# Patient Record
Sex: MALE | Race: ASIAN | NOT HISPANIC OR LATINO
[De-identification: names, ages, dates, MRNs, and addresses within clinical notes are randomized per-mention and may not be internally consistent; named-entity substitution may affect disease eponyms.]

---

## 2019-02-13 PROBLEM — Z00.00 ENCOUNTER FOR PREVENTIVE HEALTH EXAMINATION: Status: ACTIVE | Noted: 2019-02-13

## 2019-02-14 ENCOUNTER — APPOINTMENT (OUTPATIENT)
Dept: CARDIOLOGY | Facility: CLINIC | Age: 74
End: 2019-02-14

## 2019-11-07 ENCOUNTER — APPOINTMENT (OUTPATIENT)
Dept: CARDIOLOGY | Facility: CLINIC | Age: 74
End: 2019-11-07

## 2020-09-10 ENCOUNTER — NON-APPOINTMENT (OUTPATIENT)
Age: 75
End: 2020-09-10

## 2020-09-10 ENCOUNTER — APPOINTMENT (OUTPATIENT)
Dept: CARDIOLOGY | Facility: CLINIC | Age: 75
End: 2020-09-10
Payer: MEDICARE

## 2020-09-10 VITALS
WEIGHT: 174 LBS | BODY MASS INDEX: 28.99 KG/M2 | HEART RATE: 59 BPM | RESPIRATION RATE: 16 BRPM | SYSTOLIC BLOOD PRESSURE: 179 MMHG | HEIGHT: 65 IN | DIASTOLIC BLOOD PRESSURE: 81 MMHG | OXYGEN SATURATION: 96 % | TEMPERATURE: 97.7 F

## 2020-09-10 DIAGNOSIS — I25.10 ATHEROSCLEROTIC HEART DISEASE OF NATIVE CORONARY ARTERY W/OUT ANGINA PECTORIS: ICD-10-CM

## 2020-09-10 DIAGNOSIS — E78.5 HYPERLIPIDEMIA, UNSPECIFIED: ICD-10-CM

## 2020-09-10 DIAGNOSIS — Z98.61 CORONARY ANGIOPLASTY STATUS: ICD-10-CM

## 2020-09-10 DIAGNOSIS — I10 ESSENTIAL (PRIMARY) HYPERTENSION: ICD-10-CM

## 2020-09-10 DIAGNOSIS — Z87.891 PERSONAL HISTORY OF NICOTINE DEPENDENCE: ICD-10-CM

## 2020-09-10 PROCEDURE — 99204 OFFICE O/P NEW MOD 45 MIN: CPT

## 2020-09-10 PROCEDURE — 93000 ELECTROCARDIOGRAM COMPLETE: CPT

## 2020-09-10 PROCEDURE — 93306 TTE W/DOPPLER COMPLETE: CPT

## 2020-09-10 RX ORDER — METFORMIN HYDROCHLORIDE 500 MG/1
500 TABLET, COATED ORAL
Refills: 0 | Status: ACTIVE | COMMUNITY
Start: 2020-09-10

## 2020-09-10 RX ORDER — TAMSULOSIN HYDROCHLORIDE 0.4 MG/1
0.4 CAPSULE ORAL
Refills: 0 | Status: ACTIVE | COMMUNITY
Start: 2020-09-10

## 2020-09-10 RX ORDER — GLIMEPIRIDE 1 MG/1
1 TABLET ORAL
Refills: 0 | Status: ACTIVE | COMMUNITY
Start: 2020-09-10

## 2020-09-10 NOTE — DISCUSSION/SUMMARY
[FreeTextEntry1] : 74 M DM HTN hyperlipidemia CAD CABG PCI with CP and abnormal EKG consistent with progressive angina.\par DUE TO RECURRENT SYMPTOMS, REFER FOR CARDIAC CATHETERIZATION STAT.\par Continue ASA.\par Continue plavix, BB and statin.\par NG for CP.

## 2020-09-10 NOTE — HISTORY OF PRESENT ILLNESS
[FreeTextEntry1] : 1. HTN: on medications, controlled.\par 2. Hyperlipidemia: on statin.\par 3. CAD s/p CABG: patient had CABG about 20 years ago. Last PCI in 2018. He has noted worsening CP, midline, substernal, pressure-like, associated with dizziness and SOB. Symptoms have been worsening over the last 2 months. Symptoms occur on a daily basis and are increasing in frequency and limits his ET.

## 2020-09-10 NOTE — REVIEW OF SYSTEMS
[Shortness Of Breath] : shortness of breath [Dyspnea on exertion] : dyspnea during exertion [Chest Pain] : chest pain [Palpitations] : palpitations [Negative] : Endocrine

## 2020-09-10 NOTE — PHYSICAL EXAM
[Normal Appearance] : normal appearance [General Appearance - Well Developed] : well developed [Well Groomed] : well groomed [General Appearance - Well Nourished] : well nourished [No Deformities] : no deformities [General Appearance - In No Acute Distress] : no acute distress [Normal Conjunctiva] : the conjunctiva exhibited no abnormalities [Normal Oral Mucosa] : normal oral mucosa [No Oral Pallor] : no oral pallor [Eyelids - No Xanthelasma] : the eyelids demonstrated no xanthelasmas [Normal Jugular Venous A Waves Present] : normal jugular venous A waves present [No Oral Cyanosis] : no oral cyanosis [Normal Jugular Venous V Waves Present] : normal jugular venous V waves present [No Jugular Venous Napier A Waves] : no jugular venous napier A waves [Heart Sounds] : normal S1 and S2 [Heart Rate And Rhythm] : heart rate and rhythm were normal [Respiration, Rhythm And Depth] : normal respiratory rhythm and effort [Exaggerated Use Of Accessory Muscles For Inspiration] : no accessory muscle use [Auscultation Breath Sounds / Voice Sounds] : lungs were clear to auscultation bilaterally [Abdomen Tenderness] : non-tender [Abdomen Soft] : soft [Nail Clubbing] : no clubbing of the fingernails [Abdomen Mass (___ Cm)] : no abdominal mass palpated [Cyanosis, Localized] : no localized cyanosis [Petechial Hemorrhages (___cm)] : no petechial hemorrhages [Skin Color & Pigmentation] : normal skin color and pigmentation [] : no ischemic changes [No Venous Stasis] : no venous stasis [No Skin Ulcers] : no skin ulcer [Skin Lesions] : no skin lesions [No Xanthoma] : no  xanthoma was observed [Oriented To Time, Place, And Person] : oriented to person, place, and time [Affect] : the affect was normal [Mood] : the mood was normal [No Anxiety] : not feeling anxious [FreeTextEntry1] : 2/6 VENU RUSB  midline scar

## 2020-09-10 NOTE — REASON FOR VISIT
[Initial Evaluation] : an initial evaluation of [CABG Follow-up] : bypass graft [Coronary Artery Disease] : coronary artery disease [Hyperlipidemia] : hyperlipidemia [Hypertension] : hypertension [FreeTextEntry1] : 74 M HTN hyperlipidemia CAD CABG PCI with worsening CP and SOB.

## 2020-09-15 ENCOUNTER — APPOINTMENT (OUTPATIENT)
Dept: DISASTER EMERGENCY | Facility: CLINIC | Age: 75
End: 2020-09-15

## 2020-09-15 DIAGNOSIS — Z01.818 ENCOUNTER FOR OTHER PREPROCEDURAL EXAMINATION: ICD-10-CM

## 2020-09-16 LAB — SARS-COV-2 N GENE NPH QL NAA+PROBE: NOT DETECTED

## 2020-09-17 VITALS
DIASTOLIC BLOOD PRESSURE: 76 MMHG | HEART RATE: 60 BPM | HEIGHT: 64.96 IN | SYSTOLIC BLOOD PRESSURE: 176 MMHG | RESPIRATION RATE: 16 BRPM | TEMPERATURE: 98 F | WEIGHT: 169.98 LBS | OXYGEN SATURATION: 98 %

## 2020-09-17 NOTE — H&P ADULT - NSHPSOCIALHISTORY_GEN_ALL_CORE
denies ETOH, former smoker 30pack years, quit 20 yrs ago, denies illicit drug use. Denies ETOH, former smoker 30pack years, quit 20 yrs ago, denies illicit drug use.

## 2020-09-17 NOTE — H&P ADULT - NSICDXPASTMEDICALHX_GEN_ALL_CORE_FT
PAST MEDICAL HISTORY:  BPH (Benign Prostatic Hypertrophy)     CAD (Coronary Artery Disease)     Diabetes Mellitus Type II     HTN (Hypertension)     Hyperlipidemia

## 2020-09-17 NOTE — H&P ADULT - NSICDXPASTSURGICALHX_GEN_ALL_CORE_FT
PAST SURGICAL HISTORY:  CABG (Coronary Artery Bypass Graft) [facility], [place] 2000 (LIMA to LAD, SVG to OM1)    History of prostate surgery

## 2020-09-17 NOTE — H&P ADULT - NSHPLABSRESULTS_GEN_ALL_CORE
14.7   9.97  )-----------( 372      ( 18 Sep 2020 07:15 )             44.9               PT/INR - ( 18 Sep 2020 07:15 )   PT: 11.4 sec;   INR: 0.95          PTT - ( 18 Sep 2020 07:15 )  PTT:31.9 sec      EKG: NSR @ 60 BPM with RBBB and 1st degree AV block (bifasicular block)

## 2020-09-17 NOTE — H&P ADULT - ASSESSMENT
75 yo Kazakh speaking M, former smoker with PMHx of HTN, HLD, DM, BPH, CAD (CABG in 2000 LIMA to LAD, SVG to OM1, subsequent PCIs unclear where) who presented to his cardiologist Dr. Ashford c/o non-radiating SSCP described as "needles poking" and of 8/10 intensity with associated SOB occurring independent of exertion but worsened with exertion and improved with rest, over past 6 months but progressively worsening over past 2 months. Pt has used SL nitro x2 when pain was severe which has helped to resolve the pain.  In light of patients risk factors, CCS class IV anginal symptoms, known CAD requiring CABG, pt is referred for cardiac catheterization with possible intervention to r/o progressive CAD.     ASA Class III    Mallampati Class III    EF WNL. Euvoelmic on exam. NS @ 75 cc/hr pre-cath fluids.    Risks & benefits of procedure and alternative therapy have been explained to the patient including but not limited to: allergic reaction, bleeding with possible need for blood transfusion, infection, renal and vascular compromise, limb damage, arrhythmia, stroke, vessel dissection/perforation, Myocardial infarction, emergent CABG. Informed consent obtained and in chart.       Patient a candidate for sedation: Yes    Sedation Type: Moderate   73 yo Welsh speaking M, former smoker with PMHx of HTN, HLD, DM, BPH, CAD (CABG in 2000 LIMA to LAD, SVG to OM1, subsequent PCIs unclear where) who presented to his cardiologist Dr. Ashford c/o non-radiating SSCP described as "needles poking" and of 8/10 intensity with associated SOB occurring independent of exertion but worsened with exertion and improved with rest, over past 6 months but progressively worsening over past 2 months. Pt has used SL nitro x2 when pain was severe which has helped to resolve the pain.  In light of patients risk factors, CCS class IV anginal symptoms, known CAD requiring CABG, pt is referred for cardiac catheterization with possible intervention to r/o progressive CAD.     ASA Class III    Mallampati Class III    EF WNL. Euvoelmic on exam. NS @ 75 cc/hr pre-cath fluids.    Took Plavix 75 mg PO x1 this AM and endorses daily compliance. Does not take Ecotrin. Will load with Ecotrin 325 mg PO x1 pre-cath. No need for additional plavix dose pre-cath.     Risks & benefits of procedure and alternative therapy have been explained to the patient including but not limited to: allergic reaction, bleeding with possible need for blood transfusion, infection, renal and vascular compromise, limb damage, arrhythmia, stroke, vessel dissection/perforation, Myocardial infarction, emergent CABG. Informed consent obtained and in chart.       Patient a candidate for sedation: Yes    Sedation Type: Moderate   73 yo Danish speaking M, former smoker with PMHx of HTN, HLD, DM, BPH, CAD (CABG in 2000 LIMA to LAD, SVG to OM1, subsequent PCIs unclear where) who presented to his cardiologist Dr. Ashford c/o non-radiating SSCP described as "needles poking" and of 8/10 intensity with associated SOB occurring independent of exertion but worsened with exertion and improved with rest, over past 6 months but progressively worsening over past 2 months. Pt has used SL nitro x2 when pain was severe which has helped to resolve the pain.  In light of patients risk factors, CCS class IV anginal symptoms, known CAD requiring CABG, pt is referred for cardiac catheterization with possible intervention to r/o progressive CAD.     ASA Class III    Mallampati Class IV    EF WNL. Euvoelmic on exam. NS @ 75 cc/hr pre-cath fluids.    Took Plavix 75 mg PO x1 this AM and endorses daily compliance. Does not take Ecotrin. Will load with Ecotrin 325 mg PO x1 pre-cath. No need for additional plavix dose pre-cath.    Information obtained with assistance of [employer]  Alfonso #[id number]     Risks & benefits of procedure and alternative therapy have been explained to the patient including but not limited to: allergic reaction, bleeding with possible need for blood transfusion, infection, renal and vascular compromise, limb damage, arrhythmia, stroke, vessel dissection/perforation, Myocardial infarction, emergent CABG. Informed consent obtained and in chart.       Patient a candidate for sedation: Yes    Sedation Type: Moderate

## 2020-09-17 NOTE — H&P ADULT - HISTORY OF PRESENT ILLNESS
COVID: Neg in HIE   [facility] [place]   Escort: coming by car service   History obtained from [facility] [place]  #[id number]    75 y/o male Swedish speaking male former smoker with PMHx of HTN, Hyperlipidemia, DM, BPH, CAD (CABG in 2000 LIMA to LAD, SVG to OM1, subsequent PCIs unclear where) who presented to his cardiologist Dr. Ashford endorsing 8/10 Substernal Chest pain with exertion of 15 steps described "needles poking" onset 6 months ago and worsening over the last 2 months, and resolves with rest. Patient has also used SL nitro x2 when pain was severe, and helped resolve the pain. Patient dose endorse episodes of chest pain at rest. Pain is associated with shortness of breath, and palpitations. Patient denies dizziness, denies N/V, LE edema, orthopnea, fatigue. Patient had Echo on 9/10/20 revealing mild AR, mild LVH, EF 60-65%, stage II diastolic dysfunction, PASP 39mmHg. In light of patients risk factors, CCS class IV anginal symptoms and known CAD the patient is now referred for cardiac catheterization with possible intervention.    COVID: Neg in HIE   [facility] [place]   Escort: coming by car service   Cardiologist: Dr. Ashford  History obtained from [facility] [place]  #[id number]    73 yo Turkmen speaking M, former smoker with PMHx of HTN, HLD, DM, BPH, CAD (CABG in 2000 LIMA to LAD, SVG to OM1, subsequent PCIs unclear where) who presented to his cardiologist Dr. Ashford c/o non-radiating SSCP described as "needles poking" and of 8/10 intensity with associated SOB occurring independent of exertion but worsened with exertion and improved with rest, over past 6 months but progressively worsening over past 2 months. Pt has used SL nitro x2 when pain was severe which has helped to resolve the pain. Patient dose endorse episodes of chest pain at rest.  Denies dizziness, diaphoresis, palpitations, fatigue, LE edema , orthopnea, PND, syncope, N/V, abdominal pain. Echo 9/10/20: mild AR, mild LVH, EF 60-65%, stage II diastolic dysfunction, PASP 39mmHg. In light of patients risk factors, CCS class IV anginal symptoms, known CAD requiring CABG, pt is referred for cardiac catheterization with possible intervention to r/o progressive CAD.

## 2020-09-18 ENCOUNTER — INPATIENT (INPATIENT)
Facility: HOSPITAL | Age: 75
LOS: 0 days | Discharge: HOME | DRG: 247 | End: 2020-09-19
Attending: INTERNAL MEDICINE | Admitting: INTERNAL MEDICINE
Payer: MEDICARE

## 2020-09-18 ENCOUNTER — TRANSCRIPTION ENCOUNTER (OUTPATIENT)
Age: 75
End: 2020-09-18

## 2020-09-18 DIAGNOSIS — Z98.890 OTHER SPECIFIED POSTPROCEDURAL STATES: Chronic | ICD-10-CM

## 2020-09-18 LAB
A1C WITH ESTIMATED AVERAGE GLUCOSE RESULT: 7.6 % — HIGH (ref 4–5.6)
ALBUMIN SERPL ELPH-MCNC: 4.4 G/DL — SIGNIFICANT CHANGE UP (ref 3.3–5)
ALP SERPL-CCNC: 51 U/L — SIGNIFICANT CHANGE UP (ref 40–120)
ALT FLD-CCNC: 52 U/L — HIGH (ref 10–45)
ANION GAP SERPL CALC-SCNC: 12 MMOL/L — SIGNIFICANT CHANGE UP (ref 5–17)
APTT BLD: 31.9 SEC — SIGNIFICANT CHANGE UP (ref 27.5–35.5)
AST SERPL-CCNC: 32 U/L — SIGNIFICANT CHANGE UP (ref 10–40)
BASOPHILS # BLD AUTO: 0.06 K/UL — SIGNIFICANT CHANGE UP (ref 0–0.2)
BASOPHILS NFR BLD AUTO: 0.6 % — SIGNIFICANT CHANGE UP (ref 0–2)
BILIRUB SERPL-MCNC: 0.5 MG/DL — SIGNIFICANT CHANGE UP (ref 0.2–1.2)
BUN SERPL-MCNC: 20 MG/DL — SIGNIFICANT CHANGE UP (ref 7–23)
CALCIUM SERPL-MCNC: 10.2 MG/DL — SIGNIFICANT CHANGE UP (ref 8.4–10.5)
CHLORIDE SERPL-SCNC: 101 MMOL/L — SIGNIFICANT CHANGE UP (ref 96–108)
CHOLEST SERPL-MCNC: 140 MG/DL — SIGNIFICANT CHANGE UP (ref 10–199)
CK MB CFR SERPL CALC: <1 NG/ML — SIGNIFICANT CHANGE UP (ref 0–6.7)
CK SERPL-CCNC: 68 U/L — SIGNIFICANT CHANGE UP (ref 30–200)
CO2 SERPL-SCNC: 27 MMOL/L — SIGNIFICANT CHANGE UP (ref 22–31)
CREAT SERPL-MCNC: 1.21 MG/DL — SIGNIFICANT CHANGE UP (ref 0.5–1.3)
EOSINOPHIL # BLD AUTO: 0.28 K/UL — SIGNIFICANT CHANGE UP (ref 0–0.5)
EOSINOPHIL NFR BLD AUTO: 2.8 % — SIGNIFICANT CHANGE UP (ref 0–6)
ESTIMATED AVERAGE GLUCOSE: 171 MG/DL — HIGH (ref 68–114)
GLUCOSE BLDC GLUCOMTR-MCNC: 145 MG/DL — HIGH (ref 70–99)
GLUCOSE SERPL-MCNC: 164 MG/DL — HIGH (ref 70–99)
HCT VFR BLD CALC: 44.9 % — SIGNIFICANT CHANGE UP (ref 39–50)
HDLC SERPL-MCNC: 41 MG/DL — SIGNIFICANT CHANGE UP
HGB BLD-MCNC: 14.7 G/DL — SIGNIFICANT CHANGE UP (ref 13–17)
IMM GRANULOCYTES NFR BLD AUTO: 0.3 % — SIGNIFICANT CHANGE UP (ref 0–1.5)
INR BLD: 0.95 — SIGNIFICANT CHANGE UP (ref 0.88–1.16)
LIPID PNL WITH DIRECT LDL SERPL: 65 MG/DL — SIGNIFICANT CHANGE UP
LYMPHOCYTES # BLD AUTO: 3.12 K/UL — SIGNIFICANT CHANGE UP (ref 1–3.3)
LYMPHOCYTES # BLD AUTO: 31.3 % — SIGNIFICANT CHANGE UP (ref 13–44)
MCHC RBC-ENTMCNC: 28.4 PG — SIGNIFICANT CHANGE UP (ref 27–34)
MCHC RBC-ENTMCNC: 32.7 GM/DL — SIGNIFICANT CHANGE UP (ref 32–36)
MCV RBC AUTO: 86.8 FL — SIGNIFICANT CHANGE UP (ref 80–100)
MONOCYTES # BLD AUTO: 0.67 K/UL — SIGNIFICANT CHANGE UP (ref 0–0.9)
MONOCYTES NFR BLD AUTO: 6.7 % — SIGNIFICANT CHANGE UP (ref 2–14)
NEUTROPHILS # BLD AUTO: 5.81 K/UL — SIGNIFICANT CHANGE UP (ref 1.8–7.4)
NEUTROPHILS NFR BLD AUTO: 58.3 % — SIGNIFICANT CHANGE UP (ref 43–77)
NRBC # BLD: 0 /100 WBCS — SIGNIFICANT CHANGE UP (ref 0–0)
PLATELET # BLD AUTO: 372 K/UL — SIGNIFICANT CHANGE UP (ref 150–400)
POTASSIUM SERPL-MCNC: 4.4 MMOL/L — SIGNIFICANT CHANGE UP (ref 3.5–5.3)
POTASSIUM SERPL-SCNC: 4.4 MMOL/L — SIGNIFICANT CHANGE UP (ref 3.5–5.3)
PROT SERPL-MCNC: 7.9 G/DL — SIGNIFICANT CHANGE UP (ref 6–8.3)
PROTHROM AB SERPL-ACNC: 11.4 SEC — SIGNIFICANT CHANGE UP (ref 10.6–13.6)
RBC # BLD: 5.17 M/UL — SIGNIFICANT CHANGE UP (ref 4.2–5.8)
RBC # FLD: 12.6 % — SIGNIFICANT CHANGE UP (ref 10.3–14.5)
SODIUM SERPL-SCNC: 140 MMOL/L — SIGNIFICANT CHANGE UP (ref 135–145)
TOTAL CHOLESTEROL/HDL RATIO MEASUREMENT: 3.4 RATIO — SIGNIFICANT CHANGE UP (ref 3.4–9.6)
TRIGL SERPL-MCNC: 171 MG/DL — HIGH (ref 10–149)
WBC # BLD: 9.97 K/UL — SIGNIFICANT CHANGE UP (ref 3.8–10.5)
WBC # FLD AUTO: 9.97 K/UL — SIGNIFICANT CHANGE UP (ref 3.8–10.5)

## 2020-09-18 PROCEDURE — 99222 1ST HOSP IP/OBS MODERATE 55: CPT

## 2020-09-18 PROCEDURE — 92943 PRQ TRLUML REVSC CH OCC ANT: CPT | Mod: LD

## 2020-09-18 PROCEDURE — 93458 L HRT ARTERY/VENTRICLE ANGIO: CPT | Mod: 26,59

## 2020-09-18 RX ORDER — ASPIRIN/CALCIUM CARB/MAGNESIUM 324 MG
325 TABLET ORAL ONCE
Refills: 0 | Status: COMPLETED | OUTPATIENT
Start: 2020-09-18 | End: 2020-09-18

## 2020-09-18 RX ORDER — FENOFIBRATE,MICRONIZED 130 MG
1 CAPSULE ORAL
Qty: 0 | Refills: 0 | DISCHARGE

## 2020-09-18 RX ORDER — INSULIN LISPRO 100/ML
VIAL (ML) SUBCUTANEOUS
Refills: 0 | Status: DISCONTINUED | OUTPATIENT
Start: 2020-09-18 | End: 2020-09-19

## 2020-09-18 RX ORDER — INSULIN LISPRO 100/ML
VIAL (ML) SUBCUTANEOUS ONCE
Refills: 0 | Status: DISCONTINUED | OUTPATIENT
Start: 2020-09-18 | End: 2020-09-18

## 2020-09-18 RX ORDER — INFLUENZA VIRUS VACCINE 15; 15; 15; 15 UG/.5ML; UG/.5ML; UG/.5ML; UG/.5ML
0.7 SUSPENSION INTRAMUSCULAR ONCE
Refills: 0 | Status: COMPLETED | OUTPATIENT
Start: 2020-09-18 | End: 2020-09-19

## 2020-09-18 RX ORDER — SODIUM CHLORIDE 9 MG/ML
1000 INJECTION, SOLUTION INTRAVENOUS
Refills: 0 | Status: DISCONTINUED | OUTPATIENT
Start: 2020-09-18 | End: 2020-09-18

## 2020-09-18 RX ORDER — GLUCAGON INJECTION, SOLUTION 0.5 MG/.1ML
1 INJECTION, SOLUTION SUBCUTANEOUS ONCE
Refills: 0 | Status: DISCONTINUED | OUTPATIENT
Start: 2020-09-18 | End: 2020-09-18

## 2020-09-18 RX ORDER — DEXTROSE 50 % IN WATER 50 %
12.5 SYRINGE (ML) INTRAVENOUS ONCE
Refills: 0 | Status: DISCONTINUED | OUTPATIENT
Start: 2020-09-18 | End: 2020-09-19

## 2020-09-18 RX ORDER — DEXTROSE 50 % IN WATER 50 %
15 SYRINGE (ML) INTRAVENOUS ONCE
Refills: 0 | Status: DISCONTINUED | OUTPATIENT
Start: 2020-09-18 | End: 2020-09-18

## 2020-09-18 RX ORDER — SODIUM CHLORIDE 9 MG/ML
500 INJECTION INTRAMUSCULAR; INTRAVENOUS; SUBCUTANEOUS
Refills: 0 | Status: DISCONTINUED | OUTPATIENT
Start: 2020-09-18 | End: 2020-09-18

## 2020-09-18 RX ORDER — CHLORHEXIDINE GLUCONATE 213 G/1000ML
1 SOLUTION TOPICAL ONCE
Refills: 0 | Status: DISCONTINUED | OUTPATIENT
Start: 2020-09-18 | End: 2020-09-18

## 2020-09-18 RX ORDER — DEXTROSE 50 % IN WATER 50 %
25 SYRINGE (ML) INTRAVENOUS ONCE
Refills: 0 | Status: DISCONTINUED | OUTPATIENT
Start: 2020-09-18 | End: 2020-09-19

## 2020-09-18 RX ORDER — GLUCAGON INJECTION, SOLUTION 0.5 MG/.1ML
1 INJECTION, SOLUTION SUBCUTANEOUS ONCE
Refills: 0 | Status: DISCONTINUED | OUTPATIENT
Start: 2020-09-18 | End: 2020-09-19

## 2020-09-18 RX ORDER — ASPIRIN/CALCIUM CARB/MAGNESIUM 324 MG
81 TABLET ORAL DAILY
Refills: 0 | Status: DISCONTINUED | OUTPATIENT
Start: 2020-09-19 | End: 2020-09-19

## 2020-09-18 RX ORDER — DEXTROSE 50 % IN WATER 50 %
25 SYRINGE (ML) INTRAVENOUS ONCE
Refills: 0 | Status: DISCONTINUED | OUTPATIENT
Start: 2020-09-18 | End: 2020-09-18

## 2020-09-18 RX ORDER — DEXTROSE 50 % IN WATER 50 %
12.5 SYRINGE (ML) INTRAVENOUS ONCE
Refills: 0 | Status: DISCONTINUED | OUTPATIENT
Start: 2020-09-18 | End: 2020-09-18

## 2020-09-18 RX ORDER — METFORMIN HYDROCHLORIDE 850 MG/1
1 TABLET ORAL
Qty: 0 | Refills: 0 | DISCHARGE

## 2020-09-18 RX ORDER — DEXTROSE 50 % IN WATER 50 %
15 SYRINGE (ML) INTRAVENOUS ONCE
Refills: 0 | Status: DISCONTINUED | OUTPATIENT
Start: 2020-09-18 | End: 2020-09-19

## 2020-09-18 RX ORDER — SODIUM CHLORIDE 9 MG/ML
1000 INJECTION, SOLUTION INTRAVENOUS
Refills: 0 | Status: DISCONTINUED | OUTPATIENT
Start: 2020-09-18 | End: 2020-09-19

## 2020-09-18 RX ORDER — CLOPIDOGREL BISULFATE 75 MG/1
75 TABLET, FILM COATED ORAL DAILY
Refills: 0 | Status: DISCONTINUED | OUTPATIENT
Start: 2020-09-19 | End: 2020-09-19

## 2020-09-18 RX ORDER — LISINOPRIL 2.5 MG/1
10 TABLET ORAL DAILY
Refills: 0 | Status: DISCONTINUED | OUTPATIENT
Start: 2020-09-19 | End: 2020-09-19

## 2020-09-18 RX ORDER — METOPROLOL TARTRATE 50 MG
50 TABLET ORAL DAILY
Refills: 0 | Status: DISCONTINUED | OUTPATIENT
Start: 2020-09-19 | End: 2020-09-19

## 2020-09-18 RX ORDER — ATORVASTATIN CALCIUM 80 MG/1
40 TABLET, FILM COATED ORAL DAILY
Refills: 0 | Status: DISCONTINUED | OUTPATIENT
Start: 2020-09-18 | End: 2020-09-19

## 2020-09-18 RX ORDER — SODIUM CHLORIDE 9 MG/ML
500 INJECTION INTRAMUSCULAR; INTRAVENOUS; SUBCUTANEOUS
Refills: 0 | Status: DISCONTINUED | OUTPATIENT
Start: 2020-09-18 | End: 2020-09-19

## 2020-09-18 RX ORDER — INFLUENZA VIRUS VACCINE 15; 15; 15; 15 UG/.5ML; UG/.5ML; UG/.5ML; UG/.5ML
0.5 SUSPENSION INTRAMUSCULAR ONCE
Refills: 0 | Status: DISCONTINUED | OUTPATIENT
Start: 2020-09-18 | End: 2020-09-18

## 2020-09-18 RX ORDER — METOPROLOL TARTRATE 50 MG
1 TABLET ORAL
Qty: 0 | Refills: 0 | DISCHARGE

## 2020-09-18 RX ORDER — TAMSULOSIN HYDROCHLORIDE 0.4 MG/1
0.4 CAPSULE ORAL AT BEDTIME
Refills: 0 | Status: DISCONTINUED | OUTPATIENT
Start: 2020-09-18 | End: 2020-09-19

## 2020-09-18 RX ADMIN — Medication 325 MILLIGRAM(S): at 08:33

## 2020-09-18 RX ADMIN — Medication 1: at 22:21

## 2020-09-18 RX ADMIN — SODIUM CHLORIDE 75 MILLILITER(S): 9 INJECTION INTRAMUSCULAR; INTRAVENOUS; SUBCUTANEOUS at 08:33

## 2020-09-18 RX ADMIN — ATORVASTATIN CALCIUM 40 MILLIGRAM(S): 80 TABLET, FILM COATED ORAL at 21:32

## 2020-09-18 RX ADMIN — TAMSULOSIN HYDROCHLORIDE 0.4 MILLIGRAM(S): 0.4 CAPSULE ORAL at 21:32

## 2020-09-18 NOTE — DISCHARGE NOTE PROVIDER - HOSPITAL COURSE
73 yo Pashto speaking M, former smoker with PMHx of HTN, HLD, DM, BPH, CAD (CABG in 2000 LIMA to LAD, SVG to OM1, subsequent PCIs unclear where) who presented to his cardiologist Dr. Ashford c/o non-radiating SSCP described as "needles poking" and of 8/10 intensity with associated SOB occurring independent of exertion but worsened with exertion and improved with rest, over past 6 months but progressively worsening over past 2 months. Pt has used SL nitro x2 when pain was severe which has helped to resolve the pain. Patient dose endorse episodes of chest pain at rest.  Denies dizziness, diaphoresis, palpitations, fatigue, LE edema , orthopnea, PND, syncope, N/V, abdominal pain. Echo 9/10/20: mild AR, mild LVH, EF 60-65%, stage II diastolic dysfunction, PASP 39mmHg. In light of patients risk factors, CCS class IV anginal symptoms, known CAD requiring CABG, pt is referred for cardiac catheterization with possible intervention to r/o progressive CAD.     Patient was referred for cardiac catheterization on 9/18/20 revealing (cath report), procedure done via r groin with hemostasis with angioseal. Pt. seen and examined at bedside this morning, without complaints and is out of bed ambulating. Right groin stable without bleeding or hematoma, distal pulses intact. Vital signs stable, labs and telemetry reviewed. Home medication regime reviewed with Dr. Magana and patient is to continue taking ….. Pt. stable for discharge as per Dr. Magana and to f/u with Dr. Ashford in 1-2 weeks. Patient has been given appropriate discharge instructions including medication regimen, access site management and follow up. Prescriptions have been e-prescribed to patient's preferred pharmacy.   **INCOMPLETE***    75 yo Frisian speaking M, former smoker with PMHx of HTN, HLD, DM, BPH, CAD (CABG in 2000 LIMA to LAD, SVG to OM1, subsequent PCIs unclear where) who presented to his cardiologist Dr. Ashford c/o non-radiating SSCP described as "needles poking" and of 8/10 intensity with associated SOB occurring independent of exertion but worsened with exertion and improved with rest, over past 6 months but progressively worsening over past 2 months. Pt has used SL nitro x2 when pain was severe which has helped to resolve the pain. Patient dose endorse episodes of chest pain at rest.  Denies dizziness, diaphoresis, palpitations, fatigue, LE edema , orthopnea, PND, syncope, N/V, abdominal pain. Echo 9/10/20: mild AR, mild LVH, EF 60-65%, stage II diastolic dysfunction, PASP 39mmHg. In light of patients risk factors, CCS class IV anginal symptoms, known CAD requiring CABG, pt is referred for cardiac catheterization with possible intervention to r/o progressive CAD.     Patient was referred for cardiac catheterization on 9/18/20 revealing LORENA x2 to midLAD 100% , LM Patent, midLCx  filled by SVG, RCA 30-50%, SVG  to OM1 patent, LIMA to LAD atretic. EDP 8 procedure done via r groin with hemostasis with angioseal. Pt. seen and examined at bedside this morning, without complaints and is out of bed ambulating. Right groin stable without bleeding or hematoma, distal pulses intact. Vital signs stable, labs and telemetry reviewed. Home medication regime reviewed with Dr. Magana and patient is to continue taking ….. Pt. stable for discharge as per Dr. Magana and to f/u with Dr. Ashford in 1-2 weeks. Patient has been given appropriate discharge instructions including medication regimen, access site management and follow up. Prescriptions have been e-prescribed to patient's preferred pharmacy.   **INCOMPLETE***    73 yo French speaking M, former smoker with PMHx of HTN, HLD, DM, BPH, CAD (CABG in 2000 LIMA to LAD, SVG to OM1, subsequent PCIs unclear where) who presented to his cardiologist Dr. Ashford c/o non-radiating SSCP described as "needles poking" and of 8/10 intensity with associated SOB occurring independent of exertion but worsened with exertion and improved with rest, over past 6 months but progressively worsening over past 2 months. Pt has used SL nitro x2 when pain was severe which has helped to resolve the pain. Patient dose endorse episodes of chest pain at rest.  Denies dizziness, diaphoresis, palpitations, fatigue, LE edema , orthopnea, PND, syncope, N/V, abdominal pain. Echo 9/10/20: mild AR, mild LVH, EF 60-65%, stage II diastolic dysfunction, PASP 39mmHg. In light of patients risk factors, CCS class IV anginal symptoms, known CAD requiring CABG, pt is referred for cardiac catheterization with possible intervention to r/o progressive CAD.     Patient was referred for cardiac catheterization on 9/18/20 revealing LORENA x2 to midLAD 100% , LM Patent, midLCx  filled by SVG, RCA 30-50%, SVG  to OM1 patent, LIMA to LAD atretic. EDP 8 procedure done via r groin with hemostasis with angioseal. Pt. seen and examined at bedside this morning, without complaints and is out of bed ambulating. Right groin stable without bleeding or hematoma, distal pulses intact. Vital signs stable, labs and telemetry reviewed. Home medication regime reviewed with Dr. Magana and patient is to continue taking ….. Pt. stable for discharge as per Dr. Ashford and to f/u with Dr. Ashford in 1-2 weeks. Patient has been given appropriate discharge instructions including medication regimen, access site management and follow up. Prescriptions have been e-prescribed to patient's preferred pharmacy.   75 yo Pashto speaking M, former smoker with PMHx of HTN, HLD, DM, BPH, CAD (CABG in 2000 LIMA to LAD, SVG to OM1, subsequent PCIs unclear where) who presented to his cardiologist Dr. Ashford c/o non-radiating SSCP described as "needles poking" and of 8/10 intensity with associated SOB occurring independent of exertion but worsened with exertion and improved with rest, over past 6 months but progressively worsening over past 2 months. Pt has used SL nitro x2 when pain was severe which has helped to resolve the pain. Patient dose endorse episodes of chest pain at rest.  Denies dizziness, diaphoresis, palpitations, fatigue, LE edema , orthopnea, PND, syncope, N/V, abdominal pain. Echo 9/10/20: mild AR, mild LVH, EF 60-65%, stage II diastolic dysfunction, PASP 39mmHg. In light of patients risk factors, CCS class IV anginal symptoms, known CAD requiring CABG, pt is referred for cardiac catheterization with possible intervention to r/o progressive CAD.     Patient was referred for cardiac catheterization on 9/18/20 revealing LORENA x2 to midLAD 100% , LM Patent, midLCx  filled by SVG, RCA 30-50%, SVG  to OM1 patent, LIMA to LAD atretic. EDP 8 procedure done via r groin with hemostasis with angioseal. Pt. seen and examined at bedside this morning, without complaints and is out of bed ambulating. Right groin stable without bleeding or hematoma, distal pulses intact. Vital signs stable, labs and telemetry reviewed. Home medication regime reviewed with Dr. Magana and patient is to continue taking Aspirin 81mg Plavix 75mg, toprol 25mg lisinopril 10mg Imdur 30mg simvastatin 20mg Pt. stable for discharge as per Dr. Ashford and to f/u with Dr. Ashford in 1-2 weeks. Patient has been given appropriate discharge instructions including medication regimen, access site management and follow up. Prescriptions have been e-prescribed to patient's preferred pharmacy.

## 2020-09-18 NOTE — PROVIDER CONTACT NOTE (OTHER) - ACTION/TREATMENT ORDERED:
no concerns at this time per PA. morning BP medications to be given early if patient's BP remains in 170s

## 2020-09-18 NOTE — DISCHARGE NOTE PROVIDER - NSDCMRMEDTOKEN_GEN_ALL_CORE_FT
fenofibrate 134 mg oral capsule: 1 cap(s) orally once a day  Flomax 0.4 mg oral capsule: 1 cap(s) orally once a day  glimepiride 1 mg oral tablet: 1 tab(s) orally once a day  lisinopril 10 mg oral tablet: 1 tab(s) orally once a day  metFORMIN 1000 mg oral tablet: 1 tab(s) orally 2 times a day  Metoprolol Succinate ER 50 mg oral tablet, extended release: 1 tab(s) orally once a day  Plavix 75 mg oral tablet: 1 tab(s) orally once a day  simvastatin 20 mg oral tablet: 1 tab(s) orally once a day (at bedtime)   aspirin 81 mg oral delayed release tablet: 1 tab(s) orally once a day  clopidogrel 75 mg oral tablet: 1 tab(s) orally once a day  fenofibrate 134 mg oral capsule: 1 cap(s) orally once a day  Flomax 0.4 mg oral capsule: 1 cap(s) orally once a day  glimepiride 1 mg oral tablet: 1 tab(s) orally once a day  isosorbide mononitrate 30 mg oral tablet, extended release: 1 tab(s) orally once a day  lisinopril 10 mg oral tablet: 1 tab(s) orally once a day  metFORMIN 1000 mg oral tablet: 1 tab(s) orally 2 times a day  Metoprolol Succinate ER 50 mg oral tablet, extended release: 1 tab(s) orally once a day  Plavix 75 mg oral tablet: 1 tab(s) orally once a day  simvastatin 20 mg oral tablet: 1 tab(s) orally once a day (at bedtime)   aspirin 81 mg oral delayed release tablet: 1 tab(s) orally once a day  clopidogrel 75 mg oral tablet: 1 tab(s) orally once a day  fenofibrate 134 mg oral capsule: 1 cap(s) orally once a day  Flomax 0.4 mg oral capsule: 1 cap(s) orally once a day  glimepiride 1 mg oral tablet: 1 tab(s) orally once a day  isosorbide mononitrate 30 mg oral tablet, extended release: 1 tab(s) orally once a day  lisinopril 10 mg oral tablet: 1 tab(s) orally once a day  metFORMIN 1000 mg oral tablet: 1 tab(s) orally 2 times a day  Metoprolol Succinate ER 50 mg oral tablet, extended release: 1 tab(s) orally once a day  simvastatin 20 mg oral tablet: 1 tab(s) orally once a day (at bedtime)

## 2020-09-18 NOTE — DISCHARGE NOTE PROVIDER - CARE PROVIDER_API CALL
Sherman Ashford)  Cardiovascular Disease; Internal Medicine  [address] [place] [address]  Phone: [phone]  Fax: [phone]  Follow Up Time: 1 week

## 2020-09-18 NOTE — DISCHARGE NOTE PROVIDER - NSDCCPCAREPLAN_GEN_ALL_CORE_FT
PRINCIPAL DISCHARGE DIAGNOSIS  Diagnosis: Coronary artery disease  Assessment and Plan of Treatment:       SECONDARY DISCHARGE DIAGNOSES  Diagnosis: Hyperlipidemia  Assessment and Plan of Treatment:     Diagnosis: Diabetes mellitus  Assessment and Plan of Treatment:     Diagnosis: Hypertension  Assessment and Plan of Treatment:      PRINCIPAL DISCHARGE DIAGNOSIS  Diagnosis: Coronary artery disease  Assessment and Plan of Treatment: -You underwent a cardiac catheterization 9/18/20 and the blockage in your left anterior descending artery was opened with placement of 2 stents. NEVER MISS A DOSE OF ASPIRIN OR PLAVIX; IF YOU DO, YOU ARE AT RISK OF YOUR STENT CLOSING AND HAVING A HEART ATTACK. DO NOT STOP THESE TWO MEDICATIONS UNLESS INSTRUCTED TO DO SO BY YOUR CARDIOLOGIST. Your procedure was done through your groin. You do not need to keep this area covered and you may shower. Please avoid any heavy lifting  (no more than 3 to 5 lbs) or strenuous activity for five days. If you develop any swelling, bleeding, hardening of the skin (hematoma formation), acute pain, numbness/tingling  in your arm or leg please contact your doctor immediately or call our 24/7 line: [phone] Please return to the hospital/seek immediate medical attention if worsening of symptoms- including not limited to chest pain, shortness of breath. Please follow up with Dr. Ashford in 1-2 weeks. Please call his office and make an appointment to see him. Please continue a heart healthy diet low in sodium, cholesterol, and fat.      SECONDARY DISCHARGE DIAGNOSES  Diagnosis: Hyperlipidemia  Assessment and Plan of Treatment: Your cholesterol panel is abnormal. Your LDL is (INSERT) mg/dL and your goal LDL is less than 100 mg/dL. LDL is also known as "bad cholesterol" because it takes cholesterol to your arteries, where it may collect in artery walls. Too much cholesterol in your arteries may lead to a buildup of plaque known as atherosclerosis and can cause heart disease.  You can lower your LDL with medications and lifestyle modifications such as weight loss in overweight patients, aerobic exercise, and eating diets lower in saturated fats  -Your HDL is (INSERT) mg/dL and your goal HDL is greater than 50 mg/dL. The higher the HDL the better; HDL is known as “good cholesterol” because it transports cholesterol to your liver to be expelled from your body.   PLEASE CONTINUE TAKING *****    Diagnosis: Diabetes mellitus  Assessment and Plan of Treatment: DO NOT TAKE your Metformin for two days. This medication can interact with the contrast used during your procedure therefore we want to ensure the contrast has left your body prior to you restarting your Metformin. Maintain a low Carbohydrate diet. Check your blood sugar regularly. For blood sugar that is too high or too low please call your Doctor or go to the Emergency Room as necessary. Please follow up with your  Your Hemoglobin A1c is (INSERT). Your goal Hemoglobin A1c is less than 7.0%, This number measures your average blood sugar level over the last three months. Ways to lower this number include: diet, exercise, monitoring your fingersticks, adhering to your medication regimen and regular follow up during you Endocrinologist/Primary Care Doctor.    Diagnosis: Hypertension  Assessment and Plan of Treatment: Hypertension, commonly called high blood pressure, is when the force of blood pumping through your arteries is too strong. Hypertension forces your heart to work harder to pump blood. Your arteries may become narrow or stiff. Having untreated or uncontrolled hypertension for a long period of time can cause heart attack, stroke, kidney disease, and other problems.   PLEASE CONTINUE TAKING ***   Maintain a healthy lifestyle and follow up with your primary care physician. For blood pressures at home that are too high or low please see your doctor or go to the Emergency Room as necessary.     PRINCIPAL DISCHARGE DIAGNOSIS  Diagnosis: Coronary artery disease  Assessment and Plan of Treatment: -You underwent a cardiac catheterization 9/18/20 and the blockage in your left anterior descending artery was opened with placement of 2 stents. NEVER MISS A DOSE OF ASPIRIN OR PLAVIX; IF YOU DO, YOU ARE AT RISK OF YOUR STENT CLOSING AND HAVING A HEART ATTACK. DO NOT STOP THESE TWO MEDICATIONS UNLESS INSTRUCTED TO DO SO BY YOUR CARDIOLOGIST. Your procedure was done through your groin. You do not need to keep this area covered and you may shower. Please avoid any heavy lifting  (no more than 3 to 5 lbs) or strenuous activity for five days. If you develop any swelling, bleeding, hardening of the skin (hematoma formation), acute pain, numbness/tingling  in your arm or leg please contact your doctor immediately or call our 24/7 line: [phone] Please return to the hospital/seek immediate medical attention if worsening of symptoms- including not limited to chest pain, shortness of breath. Please follow up with Dr. Ashford in 1-2 weeks. Please call his office and make an appointment to see him. Please continue a heart healthy diet low in sodium, cholesterol, and fat.      SECONDARY DISCHARGE DIAGNOSES  Diagnosis: Hyperlipidemia  Assessment and Plan of Treatment: Your LDL is 65 mg/dL and your goal LDL is less than 70 mg/dL. LDL is also known as "bad cholesterol" because it takes cholesterol to your arteries, where it may collect in artery walls. Too much cholesterol in your arteries may lead to a buildup of plaque known as atherosclerosis and can cause heart disease.  You can lower your LDL with medications and lifestyle modifications such as weight loss in overweight patients, aerobic exercise, and eating diets lower in saturated fats  -Your HDL is 41 mg/dL and your goal HDL is greater than 50 mg/dL. The higher the HDL the better; HDL is known as “good cholesterol” because it transports cholesterol to your liver to be expelled from your body.   PLEASE CONTINUE TAKING *****    Diagnosis: Diabetes mellitus  Assessment and Plan of Treatment: DO NOT TAKE your Metformin for two days. This medication can interact with the contrast used during your procedure therefore we want to ensure the contrast has left your body prior to you restarting your Metformin. Maintain a low Carbohydrate diet. Check your blood sugar regularly. For blood sugar that is too high or too low please call your Doctor or go to the Emergency Room as necessary. Please follow up with your  Your Hemoglobin A1c is 7.6 Your goal Hemoglobin A1c is less than 7.0%, This number measures your average blood sugar level over the last three months. Ways to lower this number include: diet, exercise, monitoring your fingersticks, adhering to your medication regimen and regular follow up during you Endocrinologist/Primary Care Doctor.    Diagnosis: Hypertension  Assessment and Plan of Treatment: Hypertension, commonly called high blood pressure, is when the force of blood pumping through your arteries is too strong. Hypertension forces your heart to work harder to pump blood. Your arteries may become narrow or stiff. Having untreated or uncontrolled hypertension for a long period of time can cause heart attack, stroke, kidney disease, and other problems.   PLEASE CONTINUE TAKING ***  Maintain a healthy lifestyle and follow up with your primary care physician. For blood pressures at home that are too high or low please see your doctor or go to the Emergency Room as necessary.     PRINCIPAL DISCHARGE DIAGNOSIS  Diagnosis: Coronary artery disease  Assessment and Plan of Treatment: -You underwent a cardiac catheterization 9/18/20 and the blockage in your left anterior descending artery was opened with placement of 2 stents. NEVER MISS A DOSE OF ASPIRIN OR PLAVIX; IF YOU DO, YOU ARE AT RISK OF YOUR STENT CLOSING AND HAVING A HEART ATTACK. DO NOT STOP THESE TWO MEDICATIONS UNLESS INSTRUCTED TO DO SO BY YOUR CARDIOLOGIST. Your procedure was done through your groin. You do not need to keep this area covered and you may shower. Please avoid any heavy lifting  (no more than 3 to 5 lbs) or strenuous activity for five days. If you develop any swelling, bleeding, hardening of the skin (hematoma formation), acute pain, numbness/tingling  in your arm or leg please contact your doctor immediately or call our 24/7 line: [phone] Please return to the hospital/seek immediate medical attention if worsening of symptoms- including not limited to chest pain, shortness of breath. Please follow up with Dr. Ashford in 1-2 weeks. Please call his office and make an appointment to see him. Please continue a heart healthy diet low in sodium, cholesterol, and fat.      SECONDARY DISCHARGE DIAGNOSES  Diagnosis: Hyperlipidemia  Assessment and Plan of Treatment: Your LDL is 65 mg/dL and your goal LDL is less than 70 mg/dL. LDL is also known as "bad cholesterol" because it takes cholesterol to your arteries, where it may collect in artery walls. Too much cholesterol in your arteries may lead to a buildup of plaque known as atherosclerosis and can cause heart disease.  You can lower your LDL with medications and lifestyle modifications such as weight loss in overweight patients, aerobic exercise, and eating diets lower in saturated fats  -Your HDL is 41 mg/dL and your goal HDL is greater than 50 mg/dL. The higher the HDL the better; HDL is known as “good cholesterol” because it transports cholesterol to your liver to be expelled from your body.   PLEASE CONTINUE TAKING SImvastatin 20mg daily    Diagnosis: Diabetes mellitus  Assessment and Plan of Treatment: DO NOT TAKE your Metformin for two days. This medication can interact with the contrast used during your procedure therefore we want to ensure the contrast has left your body prior to you restarting your Metformin. Maintain a low Carbohydrate diet. Check your blood sugar regularly. For blood sugar that is too high or too low please call your Doctor or go to the Emergency Room as necessary.   Your Hemoglobin A1c is 7.6 Your goal Hemoglobin A1c is less than 7.0%, This number measures your average blood sugar level over the last three months. Ways to lower this number include: diet, exercise, monitoring your fingersticks, adhering to your medication regimen and regular follow up during you Endocrinologist/Primary Care Doctor.    Diagnosis: Hypertension  Assessment and Plan of Treatment: Hypertension, commonly called high blood pressure, is when the force of blood pumping through your arteries is too strong. Hypertension forces your heart to work harder to pump blood. Your arteries may become narrow or stiff. Having untreated or uncontrolled hypertension for a long period of time can cause heart attack, stroke, kidney disease, and other problems.   PLEASE CONTINUE TAKING liisinopril 10mg, toprol 25mg, youhave also been started on Imdur 30mg  Maintain a healthy lifestyle and follow up with your primary care physician. For blood pressures at home that are too high or low please see your doctor or go to the Emergency Room as necessary.

## 2020-09-19 ENCOUNTER — TRANSCRIPTION ENCOUNTER (OUTPATIENT)
Age: 75
End: 2020-09-19

## 2020-09-19 VITALS — TEMPERATURE: 98 F

## 2020-09-19 LAB
ANION GAP SERPL CALC-SCNC: 12 MMOL/L — SIGNIFICANT CHANGE UP (ref 5–17)
BASOPHILS # BLD AUTO: 0.03 K/UL — SIGNIFICANT CHANGE UP (ref 0–0.2)
BASOPHILS NFR BLD AUTO: 0.3 % — SIGNIFICANT CHANGE UP (ref 0–2)
BUN SERPL-MCNC: 24 MG/DL — HIGH (ref 7–23)
CALCIUM SERPL-MCNC: 9.6 MG/DL — SIGNIFICANT CHANGE UP (ref 8.4–10.5)
CHLORIDE SERPL-SCNC: 103 MMOL/L — SIGNIFICANT CHANGE UP (ref 96–108)
CO2 SERPL-SCNC: 24 MMOL/L — SIGNIFICANT CHANGE UP (ref 22–31)
CREAT SERPL-MCNC: 1.24 MG/DL — SIGNIFICANT CHANGE UP (ref 0.5–1.3)
EOSINOPHIL # BLD AUTO: 0.22 K/UL — SIGNIFICANT CHANGE UP (ref 0–0.5)
EOSINOPHIL NFR BLD AUTO: 2.3 % — SIGNIFICANT CHANGE UP (ref 0–6)
GLUCOSE SERPL-MCNC: 212 MG/DL — HIGH (ref 70–99)
HCT VFR BLD CALC: 41.7 % — SIGNIFICANT CHANGE UP (ref 39–50)
HGB BLD-MCNC: 13.7 G/DL — SIGNIFICANT CHANGE UP (ref 13–17)
IMM GRANULOCYTES NFR BLD AUTO: 0.4 % — SIGNIFICANT CHANGE UP (ref 0–1.5)
LYMPHOCYTES # BLD AUTO: 2.71 K/UL — SIGNIFICANT CHANGE UP (ref 1–3.3)
LYMPHOCYTES # BLD AUTO: 28 % — SIGNIFICANT CHANGE UP (ref 13–44)
MAGNESIUM SERPL-MCNC: 2.1 MG/DL — SIGNIFICANT CHANGE UP (ref 1.6–2.6)
MCHC RBC-ENTMCNC: 28.2 PG — SIGNIFICANT CHANGE UP (ref 27–34)
MCHC RBC-ENTMCNC: 32.9 GM/DL — SIGNIFICANT CHANGE UP (ref 32–36)
MCV RBC AUTO: 86 FL — SIGNIFICANT CHANGE UP (ref 80–100)
MONOCYTES # BLD AUTO: 0.72 K/UL — SIGNIFICANT CHANGE UP (ref 0–0.9)
MONOCYTES NFR BLD AUTO: 7.4 % — SIGNIFICANT CHANGE UP (ref 2–14)
NEUTROPHILS # BLD AUTO: 5.96 K/UL — SIGNIFICANT CHANGE UP (ref 1.8–7.4)
NEUTROPHILS NFR BLD AUTO: 61.6 % — SIGNIFICANT CHANGE UP (ref 43–77)
NRBC # BLD: 0 /100 WBCS — SIGNIFICANT CHANGE UP (ref 0–0)
PLATELET # BLD AUTO: 323 K/UL — SIGNIFICANT CHANGE UP (ref 150–400)
POTASSIUM SERPL-MCNC: 4 MMOL/L — SIGNIFICANT CHANGE UP (ref 3.5–5.3)
POTASSIUM SERPL-SCNC: 4 MMOL/L — SIGNIFICANT CHANGE UP (ref 3.5–5.3)
RBC # BLD: 4.85 M/UL — SIGNIFICANT CHANGE UP (ref 4.2–5.8)
RBC # FLD: 12.4 % — SIGNIFICANT CHANGE UP (ref 10.3–14.5)
SODIUM SERPL-SCNC: 139 MMOL/L — SIGNIFICANT CHANGE UP (ref 135–145)
WBC # BLD: 9.68 K/UL — SIGNIFICANT CHANGE UP (ref 3.8–10.5)
WBC # FLD AUTO: 9.68 K/UL — SIGNIFICANT CHANGE UP (ref 3.8–10.5)

## 2020-09-19 PROCEDURE — 99238 HOSP IP/OBS DSCHRG MGMT 30/<: CPT

## 2020-09-19 RX ORDER — CLOPIDOGREL BISULFATE 75 MG/1
1 TABLET, FILM COATED ORAL
Qty: 30 | Refills: 11
Start: 2020-09-19 | End: 2021-09-13

## 2020-09-19 RX ORDER — ISOSORBIDE MONONITRATE 60 MG/1
1 TABLET, EXTENDED RELEASE ORAL
Qty: 30 | Refills: 3
Start: 2020-09-19 | End: 2021-01-16

## 2020-09-19 RX ORDER — ISOSORBIDE MONONITRATE 60 MG/1
30 TABLET, EXTENDED RELEASE ORAL DAILY
Refills: 0 | Status: DISCONTINUED | OUTPATIENT
Start: 2020-09-19 | End: 2020-09-19

## 2020-09-19 RX ORDER — CLOPIDOGREL BISULFATE 75 MG/1
1 TABLET, FILM COATED ORAL
Qty: 0 | Refills: 0 | DISCHARGE

## 2020-09-19 RX ORDER — ASPIRIN/CALCIUM CARB/MAGNESIUM 324 MG
1 TABLET ORAL
Qty: 30 | Refills: 11
Start: 2020-09-19 | End: 2021-09-13

## 2020-09-19 RX ORDER — NITROGLYCERIN 6.5 MG
0.4 CAPSULE, EXTENDED RELEASE ORAL ONCE
Refills: 0 | Status: COMPLETED | OUTPATIENT
Start: 2020-09-19 | End: 2020-09-19

## 2020-09-19 RX ADMIN — Medication 50 MILLIGRAM(S): at 05:36

## 2020-09-19 RX ADMIN — Medication 0.4 MILLIGRAM(S): at 00:51

## 2020-09-19 RX ADMIN — ISOSORBIDE MONONITRATE 30 MILLIGRAM(S): 60 TABLET, EXTENDED RELEASE ORAL at 10:14

## 2020-09-19 RX ADMIN — CLOPIDOGREL BISULFATE 75 MILLIGRAM(S): 75 TABLET, FILM COATED ORAL at 10:14

## 2020-09-19 RX ADMIN — Medication 1: at 07:19

## 2020-09-19 RX ADMIN — Medication 81 MILLIGRAM(S): at 10:15

## 2020-09-19 RX ADMIN — INFLUENZA VIRUS VACCINE 0.7 MILLILITER(S): 15; 15; 15; 15 SUSPENSION INTRAMUSCULAR at 10:15

## 2020-09-19 NOTE — DISCHARGE NOTE NURSING/CASE MANAGEMENT/SOCIAL WORK - PATIENT PORTAL LINK FT
You can access the FollowMyHealth Patient Portal offered by  by registering at the following website: [email or web address]. By joining [facility]’s FollowMyHealth portal, you will also be able to view your health information using other applications (apps) compatible with our system.

## 2020-09-23 DIAGNOSIS — I45.10 UNSPECIFIED RIGHT BUNDLE-BRANCH BLOCK: ICD-10-CM

## 2020-09-23 DIAGNOSIS — I25.110 ATHEROSCLEROTIC HEART DISEASE OF NATIVE CORONARY ARTERY WITH UNSTABLE ANGINA PECTORIS: ICD-10-CM

## 2020-09-23 DIAGNOSIS — I44.0 ATRIOVENTRICULAR BLOCK, FIRST DEGREE: ICD-10-CM

## 2020-09-23 DIAGNOSIS — Z87.891 PERSONAL HISTORY OF NICOTINE DEPENDENCE: ICD-10-CM

## 2020-09-23 DIAGNOSIS — E78.5 HYPERLIPIDEMIA, UNSPECIFIED: ICD-10-CM

## 2020-09-23 DIAGNOSIS — I10 ESSENTIAL (PRIMARY) HYPERTENSION: ICD-10-CM

## 2020-09-23 DIAGNOSIS — Z79.02 LONG TERM (CURRENT) USE OF ANTITHROMBOTICS/ANTIPLATELETS: ICD-10-CM

## 2020-09-23 DIAGNOSIS — Z95.1 PRESENCE OF AORTOCORONARY BYPASS GRAFT: ICD-10-CM

## 2020-09-23 DIAGNOSIS — N40.0 BENIGN PROSTATIC HYPERPLASIA WITHOUT LOWER URINARY TRACT SYMPTOMS: ICD-10-CM

## 2020-09-23 DIAGNOSIS — E11.9 TYPE 2 DIABETES MELLITUS WITHOUT COMPLICATIONS: ICD-10-CM

## 2020-09-23 DIAGNOSIS — Z95.5 PRESENCE OF CORONARY ANGIOPLASTY IMPLANT AND GRAFT: ICD-10-CM

## 2020-09-28 ENCOUNTER — APPOINTMENT (OUTPATIENT)
Dept: CARDIOLOGY | Facility: CLINIC | Age: 75
End: 2020-09-28
Payer: MEDICARE

## 2020-09-28 VITALS
WEIGHT: 176 LBS | BODY MASS INDEX: 29.29 KG/M2 | HEART RATE: 82 BPM | DIASTOLIC BLOOD PRESSURE: 74 MMHG | SYSTOLIC BLOOD PRESSURE: 138 MMHG | RESPIRATION RATE: 16 BRPM | OXYGEN SATURATION: 95 % | TEMPERATURE: 98.2 F

## 2020-09-28 PROCEDURE — 93000 ELECTROCARDIOGRAM COMPLETE: CPT

## 2020-09-28 PROCEDURE — 99495 TRANSJ CARE MGMT MOD F2F 14D: CPT

## 2020-09-28 NOTE — PHYSICAL EXAM
[General Appearance - Well Developed] : well developed [Normal Appearance] : normal appearance [Well Groomed] : well groomed [General Appearance - Well Nourished] : well nourished [No Deformities] : no deformities [General Appearance - In No Acute Distress] : no acute distress [Normal Conjunctiva] : the conjunctiva exhibited no abnormalities [Eyelids - No Xanthelasma] : the eyelids demonstrated no xanthelasmas [Normal Oral Mucosa] : normal oral mucosa [No Oral Pallor] : no oral pallor [No Oral Cyanosis] : no oral cyanosis [Normal Jugular Venous A Waves Present] : normal jugular venous A waves present [Normal Jugular Venous V Waves Present] : normal jugular venous V waves present [No Jugular Venous Napier A Waves] : no jugular venous napier A waves [Respiration, Rhythm And Depth] : normal respiratory rhythm and effort [Exaggerated Use Of Accessory Muscles For Inspiration] : no accessory muscle use [Auscultation Breath Sounds / Voice Sounds] : lungs were clear to auscultation bilaterally [Heart Rate And Rhythm] : heart rate and rhythm were normal [Heart Sounds] : normal S1 and S2 [FreeTextEntry1] : 2/6 VENU RUSB  midline scar  [Abdomen Soft] : soft [Abdomen Tenderness] : non-tender [Abdomen Mass (___ Cm)] : no abdominal mass palpated [Nail Clubbing] : no clubbing of the fingernails [Cyanosis, Localized] : no localized cyanosis [Petechial Hemorrhages (___cm)] : no petechial hemorrhages [Skin Color & Pigmentation] : normal skin color and pigmentation [] : no rash [No Venous Stasis] : no venous stasis [Skin Lesions] : no skin lesions [No Skin Ulcers] : no skin ulcer [No Xanthoma] : no  xanthoma was observed [Oriented To Time, Place, And Person] : oriented to person, place, and time [Affect] : the affect was normal [Mood] : the mood was normal [No Anxiety] : not feeling anxious

## 2020-09-28 NOTE — REASON FOR VISIT
[Follow-Up - Clinic] : a clinic follow-up of [Coronary Artery Disease] : coronary artery disease [CABG Follow-up] : bypass graft [Hyperlipidemia] : hyperlipidemia [Hypertension] : hypertension [FreeTextEntry1] : 74 M HTN hyperlipidemia CAD CABG PCI for f/u after PCI.\par

## 2020-09-28 NOTE — DISCUSSION/SUMMARY
[FreeTextEntry1] : 74 M HTN hyperlipidemia CAD CABG after PCI.\par Patient seen within 14 days of DC.\par Continue ASA and plavix.\par Continue medications for HTN and hyperlipidemia.\par Patient to exercise and diet.\par Medications reconciled.

## 2020-09-28 NOTE — REVIEW OF SYSTEMS
[Shortness Of Breath] : shortness of breath [Dyspnea on exertion] : dyspnea during exertion [Chest Pain] : chest pain [Palpitations] : palpitations [Negative] : Heme/Lymph

## 2020-09-28 NOTE — HISTORY OF PRESENT ILLNESS
[FreeTextEntry1] :  \par 1. HTN: on medications, controlled. Compliant with medications.\par 2. Hyperlipidemia: on statin. No muscle pain is noted. \par 3. CAD s/p CABG: patient had CABG about 20 years ago. Last PCI in 2018. Patient had worsening CP and underwent cardiac catheterization that showed severe LAD stenosis. He underwent PCIx2. He is now on ASA and plavix. \par  \par

## 2020-10-08 PROCEDURE — 90662 IIV NO PRSV INCREASED AG IM: CPT

## 2020-10-08 PROCEDURE — 83036 HEMOGLOBIN GLYCOSYLATED A1C: CPT

## 2020-10-08 PROCEDURE — 83735 ASSAY OF MAGNESIUM: CPT

## 2020-10-08 PROCEDURE — C1753: CPT

## 2020-10-08 PROCEDURE — C1894: CPT

## 2020-10-08 PROCEDURE — C1769: CPT

## 2020-10-08 PROCEDURE — C1874: CPT

## 2020-10-08 PROCEDURE — C1725: CPT

## 2020-10-08 PROCEDURE — 85730 THROMBOPLASTIN TIME PARTIAL: CPT

## 2020-10-08 PROCEDURE — C1887: CPT

## 2020-10-08 PROCEDURE — 85610 PROTHROMBIN TIME: CPT

## 2020-10-08 PROCEDURE — 85025 COMPLETE CBC W/AUTO DIFF WBC: CPT

## 2020-10-08 PROCEDURE — 80061 LIPID PANEL: CPT

## 2020-10-08 PROCEDURE — 80053 COMPREHEN METABOLIC PANEL: CPT

## 2020-10-08 PROCEDURE — 82550 ASSAY OF CK (CPK): CPT

## 2020-10-08 PROCEDURE — 80048 BASIC METABOLIC PNL TOTAL CA: CPT

## 2020-10-08 PROCEDURE — 82553 CREATINE MB FRACTION: CPT

## 2020-10-08 PROCEDURE — 82962 GLUCOSE BLOOD TEST: CPT

## 2020-10-08 PROCEDURE — 36415 COLL VENOUS BLD VENIPUNCTURE: CPT

## 2020-10-26 ENCOUNTER — APPOINTMENT (OUTPATIENT)
Dept: CARDIOLOGY | Facility: CLINIC | Age: 75
End: 2020-10-26
Payer: MEDICARE

## 2020-10-26 ENCOUNTER — NON-APPOINTMENT (OUTPATIENT)
Age: 75
End: 2020-10-26

## 2020-10-26 VITALS
DIASTOLIC BLOOD PRESSURE: 83 MMHG | TEMPERATURE: 98.1 F | SYSTOLIC BLOOD PRESSURE: 183 MMHG | WEIGHT: 174 LBS | RESPIRATION RATE: 16 BRPM | BODY MASS INDEX: 28.96 KG/M2 | OXYGEN SATURATION: 97 % | HEART RATE: 61 BPM

## 2020-10-26 PROCEDURE — 99214 OFFICE O/P EST MOD 30 MIN: CPT

## 2020-10-26 PROCEDURE — 93000 ELECTROCARDIOGRAM COMPLETE: CPT

## 2020-10-26 PROCEDURE — 99072 ADDL SUPL MATRL&STAF TM PHE: CPT

## 2020-10-26 NOTE — DISCUSSION/SUMMARY
[FreeTextEntry1] : 74 M HTN hyperlipidemia CAD CABG PCI DM for f/u.\par Repeat BP check at the next visit.\par Continue DM control.\par Continue ASA, plavix and statin for CAD s/p PCI.

## 2020-10-26 NOTE — HISTORY OF PRESENT ILLNESS
[FreeTextEntry1] :   \par 1. HTN: on medications, no SE noted. \par 2. Hyperlipidemia: on statin.  \par 3. CAD s/p CABG: patient had CABG about 20 years ago. Last PCI in 2018. Patient had worsening CP and underwent cardiac catheterization that showed severe LAD stenosis. He underwent PCIx2. He is now on ASA and plavix. \par Patient reports improvement in CP. ET is stable. No cough or fevers noted.

## 2020-10-26 NOTE — PHYSICAL EXAM
[General Appearance - Well Developed] : well developed [Normal Appearance] : normal appearance [Well Groomed] : well groomed [No Deformities] : no deformities [General Appearance - Well Nourished] : well nourished [General Appearance - In No Acute Distress] : no acute distress [Normal Conjunctiva] : the conjunctiva exhibited no abnormalities [Eyelids - No Xanthelasma] : the eyelids demonstrated no xanthelasmas [Normal Oral Mucosa] : normal oral mucosa [No Oral Pallor] : no oral pallor [No Oral Cyanosis] : no oral cyanosis [Normal Jugular Venous A Waves Present] : normal jugular venous A waves present [Normal Jugular Venous V Waves Present] : normal jugular venous V waves present [No Jugular Venous Napier A Waves] : no jugular venous napier A waves [Respiration, Rhythm And Depth] : normal respiratory rhythm and effort [Exaggerated Use Of Accessory Muscles For Inspiration] : no accessory muscle use [Auscultation Breath Sounds / Voice Sounds] : lungs were clear to auscultation bilaterally [Heart Rate And Rhythm] : heart rate and rhythm were normal [Heart Sounds] : normal S1 and S2 [FreeTextEntry1] : 2/6 VENU RUSB  midline scar  [Abdomen Soft] : soft [Abdomen Tenderness] : non-tender [Abdomen Mass (___ Cm)] : no abdominal mass palpated [Nail Clubbing] : no clubbing of the fingernails [Cyanosis, Localized] : no localized cyanosis [Petechial Hemorrhages (___cm)] : no petechial hemorrhages [Skin Color & Pigmentation] : normal skin color and pigmentation [] : no rash [No Venous Stasis] : no venous stasis [Skin Lesions] : no skin lesions [No Skin Ulcers] : no skin ulcer [No Xanthoma] : no  xanthoma was observed [Oriented To Time, Place, And Person] : oriented to person, place, and time [Affect] : the affect was normal [Mood] : the mood was normal [No Anxiety] : not feeling anxious

## 2020-12-14 ENCOUNTER — APPOINTMENT (OUTPATIENT)
Dept: CARDIOLOGY | Facility: CLINIC | Age: 75
End: 2020-12-14
Payer: MEDICARE

## 2020-12-14 VITALS
RESPIRATION RATE: 17 BRPM | TEMPERATURE: 97.7 F | DIASTOLIC BLOOD PRESSURE: 84 MMHG | HEART RATE: 78 BPM | WEIGHT: 176 LBS | OXYGEN SATURATION: 95 % | SYSTOLIC BLOOD PRESSURE: 166 MMHG | BODY MASS INDEX: 29.29 KG/M2

## 2020-12-14 PROCEDURE — 93000 ELECTROCARDIOGRAM COMPLETE: CPT

## 2020-12-14 PROCEDURE — 99214 OFFICE O/P EST MOD 30 MIN: CPT

## 2020-12-14 PROCEDURE — 99072 ADDL SUPL MATRL&STAF TM PHE: CPT

## 2020-12-14 NOTE — HISTORY OF PRESENT ILLNESS
[FreeTextEntry1] :  \par 1. HTN: on medications, controlled. \par 2. Hyperlipidemia: on statin. No muscle pain is noted. \par 3. CAD s/p CABG: patient had CABG about 20 years ago. Last PCI in 2018. Patient had worsening CP and underwent cardiac catheterization that showed severe LAD stenosis. He underwent PCIx2. He is now on ASA and plavix. \par Patient denies CP but has noted worsening SOB over the last month. Patient has noted intermittent palpitations with SOB. \par  \par

## 2020-12-14 NOTE — REASON FOR VISIT
[Follow-Up - Clinic] : a clinic follow-up of [Coronary Artery Disease] : coronary artery disease [Hyperlipidemia] : hyperlipidemia [Hypertension] : hypertension [FreeTextEntry1] : 74 M HTN hyperlipidemia CAD CABG PCI with SOB.\par

## 2020-12-14 NOTE — PHYSICAL EXAM
[General Appearance - Well Developed] : well developed [Normal Appearance] : normal appearance [Well Groomed] : well groomed [General Appearance - Well Nourished] : well nourished [No Deformities] : no deformities [General Appearance - In No Acute Distress] : no acute distress [Normal Conjunctiva] : the conjunctiva exhibited no abnormalities [Eyelids - No Xanthelasma] : the eyelids demonstrated no xanthelasmas [Normal Oral Mucosa] : normal oral mucosa [No Oral Pallor] : no oral pallor [No Oral Cyanosis] : no oral cyanosis [Normal Jugular Venous A Waves Present] : normal jugular venous A waves present [Normal Jugular Venous V Waves Present] : normal jugular venous V waves present [No Jugular Venous Napier A Waves] : no jugular venous napier A waves [Respiration, Rhythm And Depth] : normal respiratory rhythm and effort [Exaggerated Use Of Accessory Muscles For Inspiration] : no accessory muscle use [Auscultation Breath Sounds / Voice Sounds] : lungs were clear to auscultation bilaterally [Heart Rate And Rhythm] : heart rate and rhythm were normal [Heart Sounds] : normal S1 and S2 [Abdomen Soft] : soft [Abdomen Tenderness] : non-tender [Abdomen Mass (___ Cm)] : no abdominal mass palpated [Nail Clubbing] : no clubbing of the fingernails [Cyanosis, Localized] : no localized cyanosis [Petechial Hemorrhages (___cm)] : no petechial hemorrhages [Skin Color & Pigmentation] : normal skin color and pigmentation [] : no rash [No Venous Stasis] : no venous stasis [Skin Lesions] : no skin lesions [No Skin Ulcers] : no skin ulcer [No Xanthoma] : no  xanthoma was observed [Oriented To Time, Place, And Person] : oriented to person, place, and time [Affect] : the affect was normal [Mood] : the mood was normal [No Anxiety] : not feeling anxious [FreeTextEntry1] : 2/6 VENU RUSB  midline scar

## 2021-01-04 ENCOUNTER — APPOINTMENT (OUTPATIENT)
Dept: CARDIOLOGY | Facility: CLINIC | Age: 76
End: 2021-01-04
Payer: MEDICARE

## 2021-01-04 VITALS
WEIGHT: 172 LBS | SYSTOLIC BLOOD PRESSURE: 183 MMHG | RESPIRATION RATE: 17 BRPM | DIASTOLIC BLOOD PRESSURE: 82 MMHG | TEMPERATURE: 98.1 F | HEART RATE: 66 BPM | OXYGEN SATURATION: 97 % | BODY MASS INDEX: 28.62 KG/M2

## 2021-01-04 PROCEDURE — 99214 OFFICE O/P EST MOD 30 MIN: CPT

## 2021-01-04 PROCEDURE — 93000 ELECTROCARDIOGRAM COMPLETE: CPT

## 2021-01-04 PROCEDURE — 99072 ADDL SUPL MATRL&STAF TM PHE: CPT

## 2021-01-04 RX ORDER — KETOCONAZOLE 20 MG/G
2 CREAM TOPICAL
Qty: 60 | Refills: 0 | Status: ACTIVE | COMMUNITY
Start: 2020-08-24

## 2021-01-04 RX ORDER — FUROSEMIDE 20 MG/1
20 TABLET ORAL
Qty: 15 | Refills: 2 | Status: ACTIVE | COMMUNITY
Start: 2020-12-14 | End: 1900-01-01

## 2021-01-04 RX ORDER — FINASTERIDE 5 MG/1
5 TABLET, FILM COATED ORAL
Qty: 90 | Refills: 0 | Status: ACTIVE | COMMUNITY
Start: 2020-07-01

## 2021-01-04 RX ORDER — ACETAMINOPHEN EXTRA STRENGTH 500 MG/1
500 TABLET ORAL
Qty: 20 | Refills: 0 | Status: ACTIVE | COMMUNITY
Start: 2020-07-06

## 2021-01-04 RX ORDER — HYDROCORTISONE 1 %
12 CREAM (GRAM) TOPICAL
Qty: 225 | Refills: 0 | Status: ACTIVE | COMMUNITY
Start: 2020-08-24

## 2021-01-04 RX ORDER — METFORMIN HYDROCHLORIDE 1000 MG/1
1000 TABLET, COATED ORAL
Qty: 180 | Refills: 0 | Status: ACTIVE | COMMUNITY
Start: 2020-07-01

## 2021-01-04 RX ORDER — SITAGLIPTIN 100 MG/1
100 TABLET, FILM COATED ORAL
Qty: 90 | Refills: 0 | Status: ACTIVE | COMMUNITY
Start: 2020-07-01

## 2021-01-04 RX ORDER — CIPROFLOXACIN HYDROCHLORIDE 500 MG/1
500 TABLET, FILM COATED ORAL
Qty: 10 | Refills: 0 | Status: ACTIVE | COMMUNITY
Start: 2020-07-06

## 2021-01-04 NOTE — HISTORY OF PRESENT ILLNESS
[FreeTextEntry1] :  \par 1. HTN: on medications, no SE noted. \par 2. Hyperlipidemia: on statin.  \par 3. CAD s/p CABG: patient had CABG about 20 years ago. Last PCI in 2018. Patient had worsening CP and underwent cardiac catheterization that showed severe LAD stenosis. He underwent PCIx2. He is now on ASA and plavix. \par \par Patient denies CP but has had intermittent SOB that is refractory to medical therapy.\par He has mild leg edema.\par He is not sure if he is taking his lasix.

## 2021-01-04 NOTE — DISCUSSION/SUMMARY
[FreeTextEntry1] : 75 M HTN hyperlipidemia CAD CABG s/p PCI with SOB.\par CHECK CXR.\par REFER TO PULM.\par Continue medications for HTN.\par Continue ASA and plavix.\par Continue lasix.

## 2021-01-04 NOTE — REASON FOR VISIT
[Follow-Up - Clinic] : a clinic follow-up of [Coronary Artery Disease] : coronary artery disease [CABG Follow-up] : bypass graft [Hyperlipidemia] : hyperlipidemia [Hypertension] : hypertension [FreeTextEntry1] : 75 M HTN hyperlipidemia CAD CABG PCI with SOB.\par \par

## 2021-01-21 NOTE — PATIENT PROFILE ADULT - FUNCTIONAL SCREEN CURRENT LEVEL: COMMUNICATION, MLM
Post Acute Skilled Nursing Home Initial Visit Note     Date of Service: 1/21/2021  Location seen at: [facility]  Subacute / Skilled Need: Rehabilitation    PCP: ESPERANZA Booth   Patient Care Team:  ESPERANZA Booth as PCP - General (Nurse Practitioner)  Jhony Hope MD as Post Acute Facility Provider: Physician (Internal Medicine)  Reema Arango NP as Post Acute Facility Provider: APC (Nurse Practitioner)  Seen by Reema Arango NP today    Ronald C Pranke is a 69 year old male presenting to Post Acute Skilled Nursing for: rehabilitation.     Gil was admitted to [facility] on 1/15 through 1/19 for fall and right intertrochanteric fracture.  Ortho consulted.  Underwent right hip trochanteric nail on 1/16.  Started on asa for DVT prophylaxis.  Vicodin for pain.  Also reporting right knee pain on admission.  Xray done and showed no fracture or dislocation.  MRI completed and reviewed by ortho.  Recommended conservative management.  Lantus was increased, metformin was held inpatient. Had hypoxia episode after getting dilaudid prior to surgery.  Placed on oxygen after surgery.  Llikely hypoventilation from narcotic and atelectasis.   Episode of fevers noted in discharge summary but not seeing notes during stay about this.  No evidence of infection per notes.  Possible atelectasis related.     History of Present Illness: He reports feeling cold and tired all the time.  Normally wears gloves and 3 pair of socks at home.  Feels weak.  Having right knee pain.  Pain can be sharp.  Pain is a 7/10.  Reports no BM for about 6 days.      HISTORY  Past Medical History:   Diagnosis Date   • Acute renal failure (ARF) (CMS/HCC) 04/06/2015   • Blood clot associated with vein wall inflammation 07/2017    DVT in legs   • BPH (benign prostatic hypertrophy)    • Cholelithiasis    • Closed fracture of one rib of left side 01/25/2016   • Coronary artery disease 03/10/2015     CABG 3/11/15   • Diabetic peripheral neuropathy (CMS/[facility])    • Diverticulosis of colon 2018    per colonoscopy report   • Epistaxis 2015   • Essential (primary) hypertension    • Fatty liver    • Frequent falls    • Generalized weakness    • Hyperlipidemia    • Hypotension 2015   • Memory loss 2017   • Non-STEMI (non-ST elevated myocardial infarction) (CMS/[facility]) 03/10/2015   • Normal pressure hydrocephalus (CMS/[facility]) 2017    s/p  shunt   • Snoring 2017   • Tremor of right hand 2015   • Wears dentures     upper denture, lower implant      reports that he quit smoking about 50 years ago. His smoking use included cigarettes. He has a 6.00 pack-year smoking history. He has never used smokeless tobacco. He reports previous alcohol use. He reports that he does not use drugs.  Past Surgical History:   Procedure Laterality Date   • Brain surgery      Pt stated, \"water on the brain\"   • Cardiac catherization  03/10/2015    Cath only - surgical intervention needed. - Dr. Mustafa   • Cardiac surgery  2015    MR with endoscopic vein; Dr. Pedroza - LIMA-->LAD, SVG-->M1, SVG-->M2   • Cardiology - stress test  2018    Abnormal   • Cdl cath poss ptca  2018   • Colonoscopy  2015   • Colonoscopy w/ biopsies  2018    Diverticulosis - Dr. TAIWO Olguin   • Fl guided lumbar puncture with imaging  3/24/2017    with pre & post PT evaluation   • Hernia repair     • Ventriculoperitoneal shunt  2017    Right  shunt - Dr. Matute     Family History   Problem Relation Age of Onset   • Cancer Mother         uterine, breast   • Heart disease Mother          in late 60's      History     Not marked as reviewed during this visit.          PROBLEM LIST:  Patient Active Problem List   Diagnosis   • Type 2 diabetes mellitus with diabetic autonomic neuropathy, with long-term current use of insulin (CMS/[facility])   • CAD (coronary artery disease) - s/p CABG 3/2015 (LIMA-LAD, VG-M1, VG-M2)    • Respiratory distress following surgery   • Generalized weakness   • Cholelithiasis   • Fatty liver   • Hypotension   • Depression   • Diabetic peripheral neuropathy (CMS/[facility])   • Diastolic dysfunction, left ventricle, Grade II   • Intermittent claudication (CMS/[facility])   • Frequent falls   • Essential (primary) hypertension   • Hyperlipidemia   • Non-STEMI (non-ST elevated myocardial infarction) (CMS/[facility])   • Lesion of nose   • Vitamin D deficiency   • Cognitive impairment   • Paradoxical insomnia   • 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 (CMS/[facility])   • Hearing loss due to cerumen impaction   • S/P  shunt   • NPH (normal pressure hydrocephalus) (CMS/[facility])   • Diverticulosis   • Hemorrhoids, internal   • Peripheral neuropathy   • Closed intertrochanteric fracture of hip, right, initial encounter (CMS/[facility])       ADVANCE DIRECTIVES:  Power of  Status:  Not Activated  Code Status:  DNR (do not resuscitate)  Goals of Care: rehabilitate and prolong survival, but only if quality of life can be maintained return home     Advance Care Planning Visit    Patient and/or decision maker consent to Advance Care Planning visit.    Inclusion criteria: Inclusion criteria not met, but goals of care discussion needed. Use .[facility] to document goals of care discussion    Attending: Dr. Hope     PCP:  ESPERANZA Booth    Meeting Leader:  Reema Graves     Participants:  Reema     Location:  [facility]     Purpose of the Meeting:  Advance Care Planning and Goals of Care discussion    Is the patient capable of making healthcare decisions: Yes, the patient is able to receive, process, and then give feedback to information regarding medical discussions.    Decision Maker: patient (if this is not the patient, provide here name and contact information)     Does the patient have an Advance Care Directive document in Epic? Yes, it is current and updated. It is the most recent version, signed and dated by the  patient, and witnessed by two non-family/non-caregiver signatures.    After code status discussion, the patient has decided on: Do Not Resuscitate  Advance Care Planning   Goals of Care Note    Patient has one or more life-limiting conditions: no    A discussion of the patient's Goals of Care has been completed with the patient regarding the following:  (1) Current Serious Conditions: NA  (2) Prognosis:          Function: Decline in mobility  Time: Would you be surprised if this patient  within the next year?  yes   (3) Patient-Centered Goals:  Aggressive, usual medical care  (4) Plan for Future Deterioration: Would be ok with returning to ICU for care.    The patient has a Good understanding of everything discussed above.    The following additional care is recommended  Routine Home Care     Ventilator: Does not want  Dialysis:  Does not want  Enteral nutrition:Does not want       Total Time spent in discussion regarding Advance Care Planning (ACP) and Goals of Care (GOC) conversation and coordination of care: 10  Minutes       DEPRESSION SCREENING:  Recent PHQ 2/9 Score    PHQ 2:  Date Adult PHQ 2 Score Adult PHQ 2 Interpretation   2021 3 Further screening needed       PHQ 9:  Date Adult PHQ 9 Score Adult PHQ 9 Interpretation   2021 19 Moderately Severe Depression       DEPRESSION ASSESSMENT/PLAN:  Moderate depression.  Will increase Effexor to 150 mg daily.  Defers counseling    ALLERGIES:  Allergies as of 2021 - Reviewed 2021   Allergen Reaction Noted   • Exenatide DIARRHEA 2018   • Metformin DIARRHEA 2018       CURRENT MEDICATIONS:   Current Outpatient Medications   Medication Sig Dispense Refill   • diclofenac (VOLTAREN) 1 % gel Apply 2 g topically 4 times daily as needed (pain). Please dispense 1 tube 20 g 0   • HYDROcodone-acetaminophen (NORCO) 5-325 MG per tablet Take 1-2 tablets by mouth every 4 hours as needed for Pain. 20 tablet 0   • insulin lispro (HumaLOG) 100  UNIT/ML scheduled dose AND correction dose Scheduled dose: 0 units 3 times a day with every meal. Correction dose: Add 2 units if glucose level 150-199, add 4 units if glucose 200-249, add 7 units if glucose level 250-299, add 10 units if glucose level 300-349, add 12 units if glucose level greater than 349, and notify your primary care doctor 1 vial 0   • polyethylene glycol (MIRALAX) 17 g packet Take 17 g by mouth daily as needed (constipation). Stir and dissolve powder in any 4 to 8 ounces of beverage, then drink. 15 packet 0   • cholecalciferol (VITAMIN D) 25 mcg (1,000 units) tablet Take 125 mcg by mouth daily. Take two tablets in the morning and three tablets at bedtime     • venlafaxine XR (EFFEXOR XR) 75 MG 24 hr capsule Take 1 capsule by mouth daily. 90 capsule 3   • OneTouch Verio test strip USE TO TEST BLOOD SUGAR FOUR TIMES DAILY 450 strip 0   • carvedilol (Coreg) 3.125 MG tablet Take 1 tablet by mouth 2 times daily (with meals). 60 tablet 5   • Cyanocobalamin (B-12) 500 MCG Tab Take 500 mcg by mouth daily. 30 tablet 11   • metFORMIN (GLUCOPHAGE) 500 MG tablet TAKE 1 TABLET BY MOUTH TWICE DAILY WITH MEALS 180 tablet 1   • aspirin 81 MG tablet Take 1 tablet by mouth daily. 30 tablet 11   • insulin detemir (LEVEMIR FLEXTOUCH) 100 UNIT/ML pen-injector Inject 80 Units into the skin 2 times daily. 24 mL 12   • Lancets (ONETOUCH DELICA PLUS GPPBCU69O) Misc 150 each 4 times daily (before meals and nightly). Test blood sugar 4 times daily before meals and bedtime, E11.43 Z79.4 One Touch Verio meter 150 each 11   • Insulin Pen Needle (B-D U/F PEN NEEDLE) 31G X 5 MM Misc USE THREE TIMES DAILY AS DIRECTED 300 each 3   • atorvastatin (LIPITOR) 80 MG tablet Take 1 tablet by mouth daily. 90 tablet 3   • donepezil (ARICEPT) 23 MG Tab Take 1 tablet by mouth nightly. 90 tablet 3   • pregabalin (LYRICA) 200 MG capsule Take 1 capsule by mouth at bedtime. 90 capsule 3   • Multiple Vitamin (ONE-A-DAY MENS PO) Take 1 tablet  by mouth daily.     • Melatonin 5 MG Cap Take 1 capsule by mouth nightly. 90 capsule 3   • acetaminophen (TYLENOL) 500 MG tablet Take 1,000 mg by mouth every 6 hours as needed for Pain. Dose: 2 tablets (=1,000 mg)       No current facility-administered medications for this visit.      Medications reviewed / reconciled: Yes    BASELINE FUNCTIONAL STATUS:  He lives in a apartment on the second floor.  He has 7 stairs, landing and 7 more stairs.  No elevator.  Was not using a walker or a cane.  He has both at home if needed.  He was doing his own cooking, cleaning and laundry.  Daughter helped with pills and bills.     CURRENT FUNCTIONAL STATUS:  Weight bearing as tolerated (WBAT)    DIET:  Consistency: General   Type: DM diet  Appetite: Good    REVIEW OF SYSTEMS:  Review of Systems   Constitutional: Positive for chills (cold all the time. chronic) and fatigue (chronic ). Negative for fever.   HENT: Negative for congestion, rhinorrhea and sore throat.    Respiratory: Negative for cough and choking.    Cardiovascular: Negative for chest pain and leg swelling.   Gastrointestinal: Positive for constipation. Negative for abdominal pain, diarrhea and nausea.   Genitourinary: Negative for difficulty urinating, dysuria, frequency and urgency.   Musculoskeletal: Positive for arthralgias (right leg pain.  Rates it a 7/10.  The pain is sharp. ).   Neurological: Positive for weakness. Negative for dizziness and headaches.   Psychiatric/Behavioral: Negative for sleep disturbance.       VITALS:  Vitals:    01/21/21 1318   BP: 126/62   Pulse: 75   SpO2: 97%   Weight: 84.8 kg       PHYSICAL ASSESSMENT:  Physical Exam  Constitutional:       General: He is awake.      Appearance: Normal appearance. He is well-developed and well-groomed. He is not ill-appearing.   Cardiovascular:      Rate and Rhythm: Normal rate and regular rhythm.      Heart sounds: Normal heart sounds.   Pulmonary:      Effort: Pulmonary effort is normal.      Breath  sounds: Normal breath sounds.   Abdominal:      General: Bowel sounds are normal.      Palpations: Abdomen is soft.      Tenderness: There is no abdominal tenderness.   Musculoskeletal:      Right knee: He exhibits swelling. He exhibits no ecchymosis, no deformity, no laceration and no erythema. Tenderness found. Lateral joint line tenderness noted. No medial joint line, no MCL, no LCL and no patellar tendon tenderness noted.      Right lower leg: No edema.      Left lower leg: No edema.   Skin:     Comments: Right upper hip with bandage.  No drainage noted.    Neurological:      Mental Status: He is alert.   Psychiatric:         Mood and Affect: Mood normal. Affect is flat.         Speech: Speech normal.         Behavior: Behavior normal. Behavior is cooperative.         Cognition and Memory: Cognition normal.         LABS:  CBC:   WBC (K/mcL)   Date Value   01/19/2021 9.5   07/23/2019 7.6     RBC (mil/mcL)   Date Value   01/19/2021 4.37 (L)   07/23/2019 5.51     HGB (g/dL)   Date Value   01/19/2021 12.8 (L)   07/23/2019 16.1     PLT (K/mcL)   Date Value   01/19/2021 234   07/23/2019 273   , BMP:   Sodium (mmol/L)   Date Value   01/17/2021 138   07/23/2019 131 (L)     Potassium (mmol/L)   Date Value   01/17/2021 3.9   07/23/2019 4.2     Chloride (mmol/L)   Date Value   01/17/2021 101   07/23/2019 95 (L)     Glucose (mg/dL)   Date Value   01/17/2021 202 (H)   07/23/2019 421 (H)     CALCIUM (mg/dL)   Date Value   07/23/2019 10.4 (H)     Calcium (mg/dL)   Date Value   01/17/2021 9.5     Carbon Dioxide (mmol/L)   Date Value   01/17/2021 26   07/23/2019 26     BUN (mg/dL)   Date Value   01/17/2021 8   07/23/2019 15     Creatinine (mg/dL)   Date Value   01/17/2021 0.77   07/23/2019 0.81    and Mg:   MAGNESIUM (mg/dL)   Date Value   01/10/2019 2.6 (H)     Magnesium (mg/dL)   Date Value   11/01/2020 1.9       ASSESSMENT AND PLAN    Fall  PT and OT     Right intertrochanteric hip fracture  -S/p trochanteric nail pinning    -DVT prophylaxis: TEDs and SCDs and Aspirin  -WBAT  Vicodin PRN pain  Follow up with ortho in 2 weeks (2/1/21)    Right knee pain  Xray done- no fracture   MRI done- demonstrates joint effusion and muscle strain.  - voltaren gel prn   - knee immobilizer if he continues to have pain    CAD/CABG  Hx nstemi   Follows cardiology, last visit 5/6/20  Follow up in 1 year from last visit   Stable no change in medications   Continue asa, statin and coreg    Diabetes type 2  Continue with humalog and levemir   Monitor BS  Continue with asa and statin  Due for foot exam, Ha1c, microalbumin- defer to outpatient   Component      Latest Ref Rng & Units 10/21/2020   GLYCOHEMOGLOBIN A1C      4.5 - 5.6 % 8.9 (H)     Component      Latest Ref Rng & Units 2/28/2020   CALCULATED LDL      <=129 mg/dL 171 (H)         Diastolic heart failure   Stable  Continue coreg  Monitor weight    NPH  -  shunt in place  - follows neurosurgery   -Last visit 7/28/20  -Follow up in 1 year with repeat CTOH to continue to monitor  shunt on annual basis    Hypoxia episode   -started after dilaudid given  - suspected hypoventilation from narcotic and atelectasis  - weaned off oxygen   - monitor     Episodes of fever  Leukocytosis   Likely reactive   No sign of infection  Resolved without intervention  Monitor     Hx DVT   Only on asa  Monitor for signs of DVT/PE    HTN  Stable   Continue with coreg    Hx of CVA  Continue with asa    Cognitive impairment  Continue with aricept  Monitor     Moderate Depression    Phq9 completed  Showing moderate depression  Increase Effexor 150 mg daily  Defers counseling     HLD  Continue with statin  Repeat LDL outpatient       FOLLOW UP APPOINTMENTS:  Future Appointments   Date Time Provider Department Center   2/1/2021  1:00 PM MD URBANO Rosa [facility]   2/1/2021  2:15 PM LILI XRAY ORTHO [id number] [facility]   3/10/2021  2:00 PM Mane Michel DPM [facility]   4/7/2021 10:00 AM ESPERANZA Booth [facility]    5/12/2021 10:15 AM Mane Michel DPM [facility]   6/2/2021 10:00 AM Ophelia Lawler RN [facility]UC [facility]   7/27/2021  1:30 PM BEHZAD CRANIAL NEUROSURGERY [facility]       DISCHARGE PLANNING: Plan is to discharge back to apartment.     Prognosis: fair    Discussed with: Patient    Barriers to discharge: therapy needs    Anticipated disposition: Home With Home Health    Total time spent is more than 40 minutes, with more than 50% of the time spent in coordination of care, counseling, review of records and discussion of plan of care with the patient /staff /family.   0 = understands/communicates without difficulty

## 2021-02-16 ENCOUNTER — APPOINTMENT (OUTPATIENT)
Dept: CARDIOLOGY | Facility: CLINIC | Age: 76
End: 2021-02-16
Payer: MEDICARE

## 2021-02-16 ENCOUNTER — NON-APPOINTMENT (OUTPATIENT)
Age: 76
End: 2021-02-16

## 2021-02-16 VITALS
RESPIRATION RATE: 18 BRPM | DIASTOLIC BLOOD PRESSURE: 77 MMHG | WEIGHT: 172 LBS | OXYGEN SATURATION: 92 % | BODY MASS INDEX: 28.62 KG/M2 | HEART RATE: 79 BPM | TEMPERATURE: 97.1 F | SYSTOLIC BLOOD PRESSURE: 156 MMHG

## 2021-02-16 PROCEDURE — 99072 ADDL SUPL MATRL&STAF TM PHE: CPT

## 2021-02-16 PROCEDURE — 93000 ELECTROCARDIOGRAM COMPLETE: CPT

## 2021-02-16 PROCEDURE — 99214 OFFICE O/P EST MOD 30 MIN: CPT

## 2021-02-16 NOTE — HISTORY OF PRESENT ILLNESS
[FreeTextEntry1] :  \par 1. HTN: on medications, controlled. \par 2. Hyperlipidemia: on statin. \par 3. CAD s/p CABG: patient had CABG about 20 years ago. Last PCI in 2018. Patient had worsening CP and underwent cardiac catheterization that showed severe LAD stenosis. He underwent PCIx2 in 9-2020. He is now on ASA and plavix. \par Patient continues to have exertional SOB. Last CXR was normal.\par His echo in 9/2020 showed normal LVEF.\par He has exertional SOB on a daily basis without cough, PND, orthopnea or leg edema.

## 2021-02-16 NOTE — DISCUSSION/SUMMARY
[FreeTextEntry1] : 75 M HTN hyperlipidemia CAD CABG s/p PCI for f/u with SOB.\par REFER TO PULM.\par Continue medications for HTN.\par Continue statin.\par ASA and plavix for CAD s/p PCI.

## 2021-02-16 NOTE — PHYSICAL EXAM
[General Appearance - Well Developed] : well developed [Normal Appearance] : normal appearance [Well Groomed] : well groomed [General Appearance - Well Nourished] : well nourished [No Deformities] : no deformities [General Appearance - In No Acute Distress] : no acute distress [Normal Conjunctiva] : the conjunctiva exhibited no abnormalities [Eyelids - No Xanthelasma] : the eyelids demonstrated no xanthelasmas [Normal Oral Mucosa] : normal oral mucosa [No Oral Pallor] : no oral pallor [No Oral Cyanosis] : no oral cyanosis [Normal Jugular Venous A Waves Present] : normal jugular venous A waves present [Normal Jugular Venous V Waves Present] : normal jugular venous V waves present [No Jugular Venous Napier A Waves] : no jugular venous napier A waves [Respiration, Rhythm And Depth] : normal respiratory rhythm and effort [Exaggerated Use Of Accessory Muscles For Inspiration] : no accessory muscle use [Auscultation Breath Sounds / Voice Sounds] : lungs were clear to auscultation bilaterally [Heart Rate And Rhythm] : heart rate and rhythm were normal [Heart Sounds] : normal S1 and S2 [FreeTextEntry1] : 2/6 VENU RUSB  midline scar  [Abdomen Soft] : soft [Abdomen Tenderness] : non-tender [Abdomen Mass (___ Cm)] : no abdominal mass palpated [Nail Clubbing] : no clubbing of the fingernails [Cyanosis, Localized] : no localized cyanosis [Petechial Hemorrhages (___cm)] : no petechial hemorrhages [Skin Color & Pigmentation] : normal skin color and pigmentation [] : no rash [No Venous Stasis] : no venous stasis [Skin Lesions] : no skin lesions [No Skin Ulcers] : no skin ulcer [No Xanthoma] : no  xanthoma was observed [Oriented To Time, Place, And Person] : oriented to person, place, and time [Affect] : the affect was normal [No Anxiety] : not feeling anxious [Mood] : the mood was normal

## 2021-02-16 NOTE — REASON FOR VISIT
[Follow-Up - Clinic] : a clinic follow-up of [Coronary Artery Disease] : coronary artery disease [Hyperlipidemia] : hyperlipidemia [Hypertension] : hypertension [FreeTextEntry1] : 75 M HTN hyperlipidemia CAD CABG PCI with SOB.\par \par

## 2021-04-12 ENCOUNTER — APPOINTMENT (OUTPATIENT)
Dept: CARDIOLOGY | Facility: CLINIC | Age: 76
End: 2021-04-12
Payer: MEDICARE

## 2021-04-12 VITALS
DIASTOLIC BLOOD PRESSURE: 75 MMHG | RESPIRATION RATE: 18 BRPM | WEIGHT: 177 LBS | BODY MASS INDEX: 29.45 KG/M2 | OXYGEN SATURATION: 96 % | SYSTOLIC BLOOD PRESSURE: 172 MMHG | HEART RATE: 94 BPM | TEMPERATURE: 98.3 F

## 2021-04-12 PROCEDURE — 99072 ADDL SUPL MATRL&STAF TM PHE: CPT

## 2021-04-12 PROCEDURE — 99214 OFFICE O/P EST MOD 30 MIN: CPT

## 2021-04-12 PROCEDURE — 93000 ELECTROCARDIOGRAM COMPLETE: CPT

## 2021-04-12 RX ORDER — LISINOPRIL 10 MG/1
10 TABLET ORAL
Qty: 30 | Refills: 5 | Status: ACTIVE | COMMUNITY
Start: 2020-09-10 | End: 1900-01-01

## 2021-04-12 NOTE — DISCUSSION/SUMMARY
[FreeTextEntry1] : 75 M HTN DM hyperlipidemia CAD s/p PCI for dental surgery.\par The patient is at an intermediate level of cardiac risk for low risk surgery.\par PATIENT CAN HOLD ASA AND PLAVIX 7 DAYS PRIOR TO PROCEDURE.\par PATIENT MUST RESUME ASA AND PLAVIX 1 DAY POST PROCEDURE.\par PATIENT RECEIVED BOTH DOSES OF HIS COVID VACCINE.

## 2021-04-12 NOTE — PHYSICAL EXAM
[General Appearance - Well Developed] : well developed [Normal Appearance] : normal appearance [Well Groomed] : well groomed [General Appearance - Well Nourished] : well nourished [No Deformities] : no deformities [General Appearance - In No Acute Distress] : no acute distress [Normal Conjunctiva] : the conjunctiva exhibited no abnormalities [Eyelids - No Xanthelasma] : the eyelids demonstrated no xanthelasmas [Normal Oral Mucosa] : normal oral mucosa [No Oral Pallor] : no oral pallor [No Oral Cyanosis] : no oral cyanosis [Normal Jugular Venous A Waves Present] : normal jugular venous A waves present [Normal Jugular Venous V Waves Present] : normal jugular venous V waves present [No Jugular Venous Napier A Waves] : no jugular venous napier A waves [Respiration, Rhythm And Depth] : normal respiratory rhythm and effort [Exaggerated Use Of Accessory Muscles For Inspiration] : no accessory muscle use [Auscultation Breath Sounds / Voice Sounds] : lungs were clear to auscultation bilaterally [Heart Rate And Rhythm] : heart rate and rhythm were normal [Heart Sounds] : normal S1 and S2 [FreeTextEntry1] : 2/6 VENU RUSB  midline scar  [Abdomen Soft] : soft [Abdomen Tenderness] : non-tender [Abdomen Mass (___ Cm)] : no abdominal mass palpated [Nail Clubbing] : no clubbing of the fingernails [Cyanosis, Localized] : no localized cyanosis [Petechial Hemorrhages (___cm)] : no petechial hemorrhages [Skin Color & Pigmentation] : normal skin color and pigmentation [] : no rash [No Venous Stasis] : no venous stasis [Skin Lesions] : no skin lesions [No Skin Ulcers] : no skin ulcer [No Xanthoma] : no  xanthoma was observed [Oriented To Time, Place, And Person] : oriented to person, place, and time [Mood] : the mood was normal [Affect] : the affect was normal [No Anxiety] : not feeling anxious

## 2021-04-12 NOTE — REASON FOR VISIT
[Follow-Up - Clinic] : a clinic follow-up of [Coronary Artery Disease] : coronary artery disease [Hyperlipidemia] : hyperlipidemia [Hypertension] : hypertension [FreeTextEntry1] : 75 M HTN hyperlipidemia CAD CABG PCI for f/u.\par \par

## 2021-06-14 ENCOUNTER — APPOINTMENT (OUTPATIENT)
Dept: CARDIOLOGY | Facility: CLINIC | Age: 76
End: 2021-06-14
Payer: MEDICARE

## 2021-06-14 ENCOUNTER — NON-APPOINTMENT (OUTPATIENT)
Age: 76
End: 2021-06-14

## 2021-06-14 VITALS
RESPIRATION RATE: 18 BRPM | SYSTOLIC BLOOD PRESSURE: 165 MMHG | HEART RATE: 77 BPM | BODY MASS INDEX: 29.45 KG/M2 | DIASTOLIC BLOOD PRESSURE: 87 MMHG | TEMPERATURE: 97.7 F | OXYGEN SATURATION: 96 % | WEIGHT: 177 LBS

## 2021-06-14 PROCEDURE — 99072 ADDL SUPL MATRL&STAF TM PHE: CPT

## 2021-06-14 PROCEDURE — 99214 OFFICE O/P EST MOD 30 MIN: CPT

## 2021-06-14 PROCEDURE — 93000 ELECTROCARDIOGRAM COMPLETE: CPT

## 2021-06-14 RX ORDER — IBUPROFEN 600 MG/1
600 TABLET, FILM COATED ORAL
Qty: 15 | Refills: 0 | Status: ACTIVE | COMMUNITY
Start: 2021-04-15

## 2021-06-14 RX ORDER — ICOSAPENT ETHYL 1 G/1
1 CAPSULE ORAL
Qty: 180 | Refills: 0 | Status: ACTIVE | COMMUNITY
Start: 2021-01-13

## 2021-06-14 RX ORDER — EMPAGLIFLOZIN 10 MG/1
10 TABLET, FILM COATED ORAL
Qty: 30 | Refills: 0 | Status: ACTIVE | COMMUNITY
Start: 2021-06-10

## 2021-06-14 RX ORDER — AMOXICILLIN 500 MG/1
500 CAPSULE ORAL
Qty: 21 | Refills: 0 | Status: ACTIVE | COMMUNITY
Start: 2021-04-15

## 2021-06-14 NOTE — DISCUSSION/SUMMARY
[FreeTextEntry1] : 75 M HTN hyperlipidemia CAD CABG PCI for f/u.\par Continue medications for HTN and hyperlipidemia.\par Continue ASA and plavix.\par EKG for CAD s/p CABG.\par Patient received both doses of his COVID vaccine.\par PULM REFERRAL FOR SOB.

## 2021-06-14 NOTE — REASON FOR VISIT
[Arrhythmia/ECG Abnorrmalities] : arrhythmia/ECG abnormalities [Hyperlipidemia] : hyperlipidemia [Hypertension] : hypertension [Coronary Artery Disease] : coronary artery disease [FreeTextEntry1] : 75 M HTN hyperlipidemia CAD CABG PCI for f/u.\par \par

## 2021-06-14 NOTE — HISTORY OF PRESENT ILLNESS
[FreeTextEntry1] :  \par \par 1. HTN: on medications, compliant with medications.\par 2. Hyperlipidemia: on statin. No muscle pain is noted.\par 3. CAD s/p CABG: patient had CABG about 20 years ago. Last PCI in 2018. Patient had worsening CP and underwent cardiac catheterization that showed severe LAD stenosis. He underwent PCIx2 in 9-2020. He is \par on ASA and plavix. \par The patient denies CP or SOB.\par No cough or fevers noted.\par ET is stable.\par The patient received both doses of his COVID vaccine.

## 2021-07-18 ENCOUNTER — APPOINTMENT (OUTPATIENT)
Dept: DISASTER EMERGENCY | Facility: CLINIC | Age: 76
End: 2021-07-18

## 2021-07-21 ENCOUNTER — APPOINTMENT (OUTPATIENT)
Dept: PULMONOLOGY | Facility: CLINIC | Age: 76
End: 2021-07-21

## 2021-08-04 ENCOUNTER — APPOINTMENT (OUTPATIENT)
Dept: PULMONOLOGY | Facility: CLINIC | Age: 76
End: 2021-08-04

## 2021-08-30 ENCOUNTER — APPOINTMENT (OUTPATIENT)
Dept: CARDIOLOGY | Facility: CLINIC | Age: 76
End: 2021-08-30

## 2021-09-27 NOTE — DISCHARGE NOTE PROVIDER - NSDCHOSPICE_GEN_A_CORE
No Mirvaso Pregnancy And Lactation Text: This medication has not been assigned a Pregnancy Risk Category. It is unknown if the medication is excreted in breast milk.

## 2021-10-11 ENCOUNTER — APPOINTMENT (OUTPATIENT)
Dept: CARDIOLOGY | Facility: CLINIC | Age: 76
End: 2021-10-11
Payer: MEDICARE

## 2021-10-11 VITALS
DIASTOLIC BLOOD PRESSURE: 65 MMHG | TEMPERATURE: 97.8 F | OXYGEN SATURATION: 95 % | RESPIRATION RATE: 18 BRPM | WEIGHT: 171 LBS | BODY MASS INDEX: 28.46 KG/M2 | SYSTOLIC BLOOD PRESSURE: 115 MMHG | HEART RATE: 81 BPM

## 2021-10-11 PROCEDURE — 93000 ELECTROCARDIOGRAM COMPLETE: CPT

## 2021-10-11 PROCEDURE — 99214 OFFICE O/P EST MOD 30 MIN: CPT

## 2021-10-11 NOTE — PHYSICAL EXAM
[Well Developed] : well developed [Well Nourished] : well nourished [No Acute Distress] : no acute distress [Normal Conjunctiva] : normal conjunctiva [Normal Venous Pressure] : normal venous pressure [No Carotid Bruit] : no carotid bruit [Normal S1, S2] : normal S1, S2 [No Rub] : no rub [No Gallop] : no gallop [Clear Lung Fields] : clear lung fields [Good Air Entry] : good air entry [No Respiratory Distress] : no respiratory distress  [Soft] : abdomen soft [Non Tender] : non-tender [No Masses/organomegaly] : no masses/organomegaly [Normal Bowel Sounds] : normal bowel sounds [Normal Gait] : normal gait [No Edema] : no edema [No Cyanosis] : no cyanosis [No Clubbing] : no clubbing [No Varicosities] : no varicosities [No Rash] : no rash [No Skin Lesions] : no skin lesions [Moves all extremities] : moves all extremities [No Focal Deficits] : no focal deficits [Normal Speech] : normal speech [Alert and Oriented] : alert and oriented [Normal memory] : normal memory [de-identified] : midline scar

## 2021-10-11 NOTE — HISTORY OF PRESENT ILLNESS
[FreeTextEntry1] :  \par 1. HTN: on medications, controlled.\par 2. Hyperlipidemia: on statin. No SE are noted.\par 3. CAD s/p CABG: patient had CABG about 20 years ago. Last PCI in 2018. Patient had worsening CP and underwent cardiac catheterization that showed severe LAD stenosis. He underwent PCIx2 in 9-2020. He is \par on ASA and plavix. \par  \par The patient received both doses of his COVID vaccine. \par \par He has not received his flu vaccine.\par \par He denies CP but has moderate exertional SOB. \par \par The patient recently lost his younger brother to cancer in 8/2021.\par

## 2021-10-11 NOTE — REASON FOR VISIT
[Hyperlipidemia] : hyperlipidemia [Hypertension] : hypertension [Coronary Artery Disease] : coronary artery disease [FreeTextEntry1] : 75 M HTN hyperlipidemia CAD CABG PCI for f/u.\par \par

## 2021-10-11 NOTE — DISCUSSION/SUMMARY
[FreeTextEntry1] : 75 M HTN hyperlipidemia CAD CABG PCI with SOB.\par REFER TO PULM.\par Continue ASA 81 and plavix 75 for CAD s/p PCI.\par Continue medications for HTN and hyperlipidemia.\par Patient received his COVID vaccine.

## 2022-01-03 ENCOUNTER — RESULT CHARGE (OUTPATIENT)
Age: 77
End: 2022-01-03

## 2022-01-03 ENCOUNTER — APPOINTMENT (OUTPATIENT)
Dept: CARDIOLOGY | Facility: CLINIC | Age: 77
End: 2022-01-03
Payer: MEDICARE

## 2022-01-03 VITALS
OXYGEN SATURATION: 95 % | SYSTOLIC BLOOD PRESSURE: 176 MMHG | TEMPERATURE: 97.6 F | HEART RATE: 84 BPM | WEIGHT: 162 LBS | DIASTOLIC BLOOD PRESSURE: 77 MMHG | RESPIRATION RATE: 18 BRPM | BODY MASS INDEX: 26.96 KG/M2

## 2022-01-03 DIAGNOSIS — I45.10 UNSPECIFIED RIGHT BUNDLE-BRANCH BLOCK: ICD-10-CM

## 2022-01-03 PROCEDURE — 99214 OFFICE O/P EST MOD 30 MIN: CPT

## 2022-01-03 RX ORDER — SIMVASTATIN 20 MG/1
20 TABLET, FILM COATED ORAL DAILY
Qty: 30 | Refills: 5 | Status: ACTIVE | COMMUNITY
Start: 2020-09-10 | End: 1900-01-01

## 2022-01-03 RX ORDER — FENOFIBRATE 145 MG/1
145 TABLET, COATED ORAL
Qty: 90 | Refills: 2 | Status: ACTIVE | COMMUNITY
Start: 2020-09-10 | End: 1900-01-01

## 2022-01-03 RX ORDER — ASPIRIN 81 MG/1
81 TABLET ORAL
Qty: 30 | Refills: 5 | Status: ACTIVE | COMMUNITY
Start: 2020-09-28 | End: 1900-01-01

## 2022-01-03 RX ORDER — METOPROLOL SUCCINATE 50 MG/1
50 TABLET, EXTENDED RELEASE ORAL
Qty: 30 | Refills: 5 | Status: ACTIVE | COMMUNITY
Start: 2020-09-10 | End: 1900-01-01

## 2022-01-03 RX ORDER — LISINOPRIL 20 MG/1
20 TABLET ORAL
Qty: 1 | Refills: 5 | Status: ACTIVE | COMMUNITY
Start: 2021-12-23 | End: 1900-01-01

## 2022-01-03 RX ORDER — PEN NEEDLE, DIABETIC 31 G X1/4"
32G X 4 MM NEEDLE, DISPOSABLE MISCELLANEOUS
Qty: 100 | Refills: 0 | Status: ACTIVE | COMMUNITY
Start: 2021-09-30

## 2022-01-03 RX ORDER — CLOPIDOGREL BISULFATE 75 MG/1
75 TABLET, FILM COATED ORAL DAILY
Qty: 30 | Refills: 5 | Status: ACTIVE | COMMUNITY
Start: 2020-09-10 | End: 1900-01-01

## 2022-01-03 RX ORDER — ISOPRPOPYL ALCOHOL 70 ML/100ML
70 SWAB TOPICAL
Qty: 100 | Refills: 0 | Status: ACTIVE | COMMUNITY
Start: 2021-09-30

## 2022-01-03 RX ORDER — INSULIN GLARGINE 100 [IU]/ML
100 INJECTION, SOLUTION SUBCUTANEOUS
Qty: 15 | Refills: 0 | Status: ACTIVE | COMMUNITY
Start: 2021-10-02

## 2022-01-03 NOTE — DISCUSSION/SUMMARY
[FreeTextEntry1] : 76 M HTN hyperlipidemia CAD CABG PCI for f/u.\par Echo reviewed.\par The patient did not take his BP meds this morning. We will repeat it at the next visit.\par Continue ASA 81 and plavix 75 for CAD s/p PCI.\par Continue statin for hyperlipidemia.\par Patient received his COVID vaccine. \par

## 2022-01-03 NOTE — REASON FOR VISIT
[FreeTextEntry1] : 75 M HTN hyperlipidemia CAD CABG PCI for f/u.\par [phone]\par Dr. Hal Pitts\par Echo 9-2020\par

## 2022-01-03 NOTE — HISTORY OF PRESENT ILLNESS
[FreeTextEntry1] :  \par 1. HTN: on medications, no SE noted.\par 2. Hyperlipidemia: on statin.  \par 3. CAD s/p CABG: patient had CABG about 20 years ago. The patient had PCI in 2018 and also underwent PCI of the LAD in 9/2020.\par He is on ASA and plavix. He denies CP or SOB.   \par  \par The patient received both doses of his COVID vaccine. \par \par His ET is stable.\par \par The patient recently lost his younger brother to cancer in 8/2021.\par

## 2022-04-11 ENCOUNTER — APPOINTMENT (OUTPATIENT)
Dept: CARDIOLOGY | Facility: CLINIC | Age: 77
End: 2022-04-11
Payer: MEDICARE

## 2022-04-11 VITALS
HEART RATE: 70 BPM | OXYGEN SATURATION: 95 % | WEIGHT: 165 LBS | RESPIRATION RATE: 18 BRPM | BODY MASS INDEX: 27.46 KG/M2 | SYSTOLIC BLOOD PRESSURE: 135 MMHG | DIASTOLIC BLOOD PRESSURE: 62 MMHG | TEMPERATURE: 97.6 F

## 2022-04-11 PROCEDURE — 99214 OFFICE O/P EST MOD 30 MIN: CPT

## 2022-04-11 PROCEDURE — 93000 ELECTROCARDIOGRAM COMPLETE: CPT

## 2022-04-11 NOTE — HISTORY OF PRESENT ILLNESS
[FreeTextEntry1] :  \par 1. HTN: on medications, controlled.\par 2. Hyperlipidemia: on statin. The lipid profile is followed by PMD.\par 3. CAD s/p CABG: patient had CABG about 20 years ago. The patient had PCI in 2018 and also underwent PCI of the LAD in 9/2020.\par He is on ASA and plavix.  \par The patient received both doses of his COVID vaccine. \par \par The patient recently lost his younger brother to cancer in 8/2021.\par \par The patient denies CP or SOB. He has had intermittent dizziness but no syncope. \par \par [place] had [place].

## 2022-04-11 NOTE — PHYSICAL EXAM
[Well Developed] : well developed [Well Nourished] : well nourished [No Acute Distress] : no acute distress [Normal Conjunctiva] : normal conjunctiva [Normal Venous Pressure] : normal venous pressure [No Carotid Bruit] : no carotid bruit [Normal S1, S2] : normal S1, S2 [No Rub] : no rub [No Gallop] : no gallop [Clear Lung Fields] : clear lung fields [Good Air Entry] : good air entry [No Respiratory Distress] : no respiratory distress  [Soft] : abdomen soft [Non Tender] : non-tender [No Masses/organomegaly] : no masses/organomegaly [Normal Bowel Sounds] : normal bowel sounds [Normal Gait] : normal gait [No Edema] : no edema [No Cyanosis] : no cyanosis [No Clubbing] : no clubbing [No Varicosities] : no varicosities [No Rash] : no rash [No Skin Lesions] : no skin lesions [Moves all extremities] : moves all extremities [No Focal Deficits] : no focal deficits [Normal Speech] : normal speech [Alert and Oriented] : alert and oriented [Normal memory] : normal memory [de-identified] : midline scar

## 2022-04-11 NOTE — DISCUSSION/SUMMARY
[FreeTextEntry1] : 76 M HTN hyperlipidemia CAD CABG PCI for f/u.\par \par Continue ASA 81 and plavix 75 for CAD s/p PCI.\par Continue statin for hyperlipidemia.\par Continue medications for HTN.\par Continue exercise and diet.\par

## 2022-07-18 ENCOUNTER — APPOINTMENT (OUTPATIENT)
Dept: CARDIOLOGY | Facility: CLINIC | Age: 77
End: 2022-07-18

## 2022-07-18 VITALS
RESPIRATION RATE: 17 BRPM | TEMPERATURE: 97.7 F | WEIGHT: 161 LBS | HEART RATE: 69 BPM | BODY MASS INDEX: 26.79 KG/M2 | DIASTOLIC BLOOD PRESSURE: 73 MMHG | OXYGEN SATURATION: 95 % | SYSTOLIC BLOOD PRESSURE: 156 MMHG

## 2022-07-18 PROCEDURE — 93000 ELECTROCARDIOGRAM COMPLETE: CPT

## 2022-07-18 PROCEDURE — 99214 OFFICE O/P EST MOD 30 MIN: CPT

## 2022-07-18 NOTE — DISCUSSION/SUMMARY
[FreeTextEntry1] : 76 M HTN hyperlipidemia CAD CABG PCI for f/u with chest pain.\par CHECK CARDIAC CTA DUE TO CP.\par Continue ASA 81 and plavix 75 for CAD s/p PCI.\par Continue statin for hyperlipidemia.\par Continue medications for HTN.\par

## 2022-07-18 NOTE — HISTORY OF PRESENT ILLNESS
[FreeTextEntry1] :  \par 1. HTN: on medications, compliant with medications.\par 2. Hyperlipidemia: on statin. No SE are noted.\par 3. CAD s/p CABG: patient had CABG about 20 years ago. The patient had PCI in 2018 and also underwent PCI of the LAD in 9/2020. He is on ASA and plavix. \par The patient received both doses of his COVID vaccine. \par \par The patient recently lost his younger brother to cancer in 8/2021.\par  \par He had [place] the [place]. \par \par The patient has had intermittent CP over the last two weeks. His CP is midline, substernal and relieved with rest. His symptoms are increasing in severity and relieved with rest. His symptoms occur several times/ week.\par  \par

## 2022-07-18 NOTE — PHYSICAL EXAM
[Well Developed] : well developed [Well Nourished] : well nourished [No Acute Distress] : no acute distress [Normal Conjunctiva] : normal conjunctiva [Normal Venous Pressure] : normal venous pressure [No Carotid Bruit] : no carotid bruit [Normal S1, S2] : normal S1, S2 [No Rub] : no rub [No Gallop] : no gallop [Clear Lung Fields] : clear lung fields [Good Air Entry] : good air entry [No Respiratory Distress] : no respiratory distress  [Soft] : abdomen soft [Non Tender] : non-tender [No Masses/organomegaly] : no masses/organomegaly [Normal Bowel Sounds] : normal bowel sounds [Normal Gait] : normal gait [No Edema] : no edema [No Cyanosis] : no cyanosis [No Clubbing] : no clubbing [No Varicosities] : no varicosities [No Rash] : no rash [No Skin Lesions] : no skin lesions [Moves all extremities] : moves all extremities [No Focal Deficits] : no focal deficits [Normal Speech] : normal speech [Alert and Oriented] : alert and oriented [Normal memory] : normal memory [de-identified] : midline scar

## 2022-07-18 NOTE — REASON FOR VISIT
[Hyperlipidemia] : hyperlipidemia [Hypertension] : hypertension [Coronary Artery Disease] : coronary artery disease [FreeTextEntry1] : 76 M HTN hyperlipidemia CAD CABG PCI for f/u.\par [phone]\par Dr. Hal Pitts\par Echo 9-2020\par

## 2022-09-26 VITALS
TEMPERATURE: 98 F | DIASTOLIC BLOOD PRESSURE: 82 MMHG | RESPIRATION RATE: 18 BRPM | OXYGEN SATURATION: 95 % | HEIGHT: 64.96 IN | SYSTOLIC BLOOD PRESSURE: 187 MMHG | WEIGHT: 154.32 LBS | HEART RATE: 58 BPM

## 2022-09-26 RX ORDER — CHLORHEXIDINE GLUCONATE 213 G/1000ML
1 SOLUTION TOPICAL ONCE
Refills: 0 | Status: DISCONTINUED | OUTPATIENT
Start: 2022-10-07 | End: 2022-10-22

## 2022-09-26 NOTE — H&P ADULT - NSHPLABSRESULTS_GEN_ALL_CORE
14.9   11.09 )-----------( 431      ( 07 Oct 2022 16:45 )             44.2       10-07    138  |  103  |  x   ----------------------------<  127<H>  4.7   |  23  |  x     Ca    9.8      07 Oct 2022 16:45  Mg     2.1     10-07        PT/INR - ( 07 Oct 2022 16:45 )   PT: 11.7 sec;   INR: 0.98          PTT - ( 07 Oct 2022 16:45 )  PTT:31.9 sec

## 2022-09-26 NOTE — H&P ADULT - ASSESSMENT
EKG: sinus daina at 58bpm, no ST/Twave inversions			  ASA III			Mallampati class: __         Anginal Class: __    -No Known Allergies    -H/H = 14.9/44.2  . Pt denies BRBPR, hematuria, hematochezia, melena. Pt reports good compliance w/ home aspirin, stating his last dose was 10/6. Although he is prescribed plavix, he is adamant he does not take it. Pt loaded w/ ASA ___mg x1 and Plavix ___mg x1  -BUN/Cr = 24/1.36. EF ___. Euvolemic on exam. IV NS @ ___cc/hr x __hrs started pre procedure    Sedation Plan:   Moderate  Patient Is Suitable Candidate For Sedation?     Yes    Risks & benefits of procedure and alternative therapy have been explained to the patient including but not limited to: allergic reaction, bleeding with possible need for blood transfusion, infection, renal and vascular compromise, limb damage, arrhythmia, stroke, vessel dissection/perforation, myocardial infarction, and emergent CABG. Informed consent obtained at bedside and included in chart. EKG: sinus daina at 58bpm, no ST/Twave inversions			  ASA III			Mallampati class: III      Anginal Class: III    -No Known Allergies    -H/H = 14.9/44.2  . Pt denies BRBPR, hematuria, hematochezia, melena. Pt reports good compliance w/ home aspirin, stating his last dose was 10/6. Although he is prescribed plavix, he is adamant he does not take it. Pt loaded w/ ASA 81mg x1 and Plavix 300mg x1  -BUN/Cr = 24/1.36. Euvolemic on exam. IV NS @ 250cc bolus followed by 75cc/2hrs started pre procedure    Sedation Plan:   Moderate  Patient Is Suitable Candidate For Sedation?     Yes    Risks & benefits of procedure and alternative therapy have been explained to the patient including but not limited to: allergic reaction, bleeding with possible need for blood transfusion, infection, renal and vascular compromise, limb damage, arrhythmia, stroke, vessel dissection/perforation, myocardial infarction, and emergent CABG. Informed consent obtained at bedside and included in chart.

## 2022-09-26 NOTE — H&P ADULT - HISTORY OF PRESENT ILLNESS
CARDIOLOGIST: Dr. Ashford  COVID:   PHARMACY:   ESCORT:     Pt is 75yo Divehi-speaking Male, former smoker, w/ PMHx of HTN, HLD, DM T2, BPH, CAD (CABG 2000 LIMA-LAD, SVG-OM1; multiple PCIs, most recent 9/2020) who presented to his cardioogist, Dr. Ashford, endorsing worsening CP/SOB over the past month. CP started ~1 month ago, is and has progressively worsened. CP is midline, substernal, and presure-like. Symptoms are relieved w/ rest and NTG. He has recently been using NGT 2-3x/day. He subsequently had a CCTA (per MD note) showing mod-severe pLAD disease and an occlusion of LIMA-LAD graft.     Pt denies _____    Cardiac Cath (9/18/20): LORENA x2 mLAD; LM patent, mLCx  (fills via SVG), RCA 30-50%, SVG-OM1 patent, LIMA-LAD atretic.     In light of patient's risk factors, worsening CCS Class III anginal symptoms, and abnormal CCTA, patient now presents to [facility] for cardiac catheterization with possible intervention if clinically indicated.  CARDIOLOGIST: Dr. Ashford  COVID: (+) in HIE 9/29  PHARMACY: [facility], [place]  ESCORT: Transport from Medicaid    Pt is 77yo Frisian-speaking Male, former smoker, w/ PMHx of HTN, HLD, DM T2, BPH, CAD (CABG 2000 LIMA-LAD, SVG-OM1; multiple PCIs, most recent 9/2020) who presented to his cardioogist, Dr. Ashford, endorsing worsening CP/SOB over the past month. CP started ~1 month ago, is and has progressively worsened. CP is midline, substernal, and presure-like. Symptoms are relieved w/ rest and NTG. He has recently been using NGT 2-3x/day. He subsequently had a CCTA (per MD note) showing mod-severe pLAD disease and an occlusion of LIMA-LAD graft. Pt currently denies chest pain, SOB, [place], palpitations, dizziness, LOC, N/V/D, fever/chills/sick contact, diaphoresis, orthopnea/PND, and leg swelling.    Cardiac Cath (9/18/20): LORENA x2 mLAD; LM patent, mLCx  (fills via SVG), RCA 30-50%, SVG-OM1 patent, LIMA-LAD atretic.     In light of patient's risk factors, worsening CCS Class III anginal symptoms, and abnormal CCTA, patient now presents to [facility] for cardiac catheterization with possible intervention if clinically indicated.

## 2022-10-03 ENCOUNTER — RX RENEWAL (OUTPATIENT)
Age: 77
End: 2022-10-03

## 2022-10-07 ENCOUNTER — OUTPATIENT (OUTPATIENT)
Dept: OUTPATIENT SERVICES | Facility: HOSPITAL | Age: 77
LOS: 1 days | Discharge: HOME | End: 2022-10-07
Payer: MEDICARE

## 2022-10-07 DIAGNOSIS — Z98.890 OTHER SPECIFIED POSTPROCEDURAL STATES: Chronic | ICD-10-CM

## 2022-10-07 LAB
A1C WITH ESTIMATED AVERAGE GLUCOSE RESULT: 8.8 % — HIGH (ref 4–5.6)
ALBUMIN SERPL ELPH-MCNC: 4.3 G/DL — SIGNIFICANT CHANGE UP (ref 3.3–5)
ALP SERPL-CCNC: 51 U/L — SIGNIFICANT CHANGE UP (ref 40–120)
ALT FLD-CCNC: 22 U/L — SIGNIFICANT CHANGE UP (ref 10–45)
ANION GAP SERPL CALC-SCNC: 10 MMOL/L — SIGNIFICANT CHANGE UP (ref 5–17)
ANION GAP SERPL CALC-SCNC: 12 MMOL/L — SIGNIFICANT CHANGE UP (ref 5–17)
APTT BLD: 31.9 SEC — SIGNIFICANT CHANGE UP (ref 27.5–35.5)
AST SERPL-CCNC: 26 U/L — SIGNIFICANT CHANGE UP (ref 10–40)
BASOPHILS # BLD AUTO: 0.04 K/UL — SIGNIFICANT CHANGE UP (ref 0–0.2)
BASOPHILS NFR BLD AUTO: 0.4 % — SIGNIFICANT CHANGE UP (ref 0–2)
BILIRUB SERPL-MCNC: 0.4 MG/DL — SIGNIFICANT CHANGE UP (ref 0.2–1.2)
BUN SERPL-MCNC: 23 MG/DL — SIGNIFICANT CHANGE UP (ref 7–23)
BUN SERPL-MCNC: 24 MG/DL — HIGH (ref 7–23)
CALCIUM SERPL-MCNC: 9.1 MG/DL — SIGNIFICANT CHANGE UP (ref 8.4–10.5)
CALCIUM SERPL-MCNC: 9.8 MG/DL — SIGNIFICANT CHANGE UP (ref 8.4–10.5)
CHLORIDE SERPL-SCNC: 103 MMOL/L — SIGNIFICANT CHANGE UP (ref 96–108)
CHLORIDE SERPL-SCNC: 104 MMOL/L — SIGNIFICANT CHANGE UP (ref 96–108)
CHOLEST SERPL-MCNC: 160 MG/DL — SIGNIFICANT CHANGE UP
CK MB CFR SERPL CALC: <1 NG/ML — SIGNIFICANT CHANGE UP (ref 0–6.7)
CK SERPL-CCNC: 60 U/L — SIGNIFICANT CHANGE UP (ref 30–200)
CO2 SERPL-SCNC: 23 MMOL/L — SIGNIFICANT CHANGE UP (ref 22–31)
CO2 SERPL-SCNC: 24 MMOL/L — SIGNIFICANT CHANGE UP (ref 22–31)
CREAT SERPL-MCNC: 1.25 MG/DL — SIGNIFICANT CHANGE UP (ref 0.5–1.3)
CREAT SERPL-MCNC: 1.36 MG/DL — HIGH (ref 0.5–1.3)
EGFR: 54 ML/MIN/1.73M2 — LOW
EGFR: 59 ML/MIN/1.73M2 — LOW
EOSINOPHIL # BLD AUTO: 0.31 K/UL — SIGNIFICANT CHANGE UP (ref 0–0.5)
EOSINOPHIL NFR BLD AUTO: 2.8 % — SIGNIFICANT CHANGE UP (ref 0–6)
ESTIMATED AVERAGE GLUCOSE: 206 MG/DL — HIGH (ref 68–114)
GLUCOSE BLDC GLUCOMTR-MCNC: 115 MG/DL — HIGH (ref 70–99)
GLUCOSE BLDC GLUCOMTR-MCNC: 121 MG/DL — HIGH (ref 70–99)
GLUCOSE SERPL-MCNC: 121 MG/DL — HIGH (ref 70–99)
GLUCOSE SERPL-MCNC: 127 MG/DL — HIGH (ref 70–99)
HCT VFR BLD CALC: 42.6 % — SIGNIFICANT CHANGE UP (ref 39–50)
HCT VFR BLD CALC: 44.2 % — SIGNIFICANT CHANGE UP (ref 39–50)
HDLC SERPL-MCNC: 41 MG/DL — SIGNIFICANT CHANGE UP
HGB BLD-MCNC: 14.2 G/DL — SIGNIFICANT CHANGE UP (ref 13–17)
HGB BLD-MCNC: 14.9 G/DL — SIGNIFICANT CHANGE UP (ref 13–17)
IMM GRANULOCYTES NFR BLD AUTO: 0.3 % — SIGNIFICANT CHANGE UP (ref 0–0.9)
INR BLD: 0.98 — SIGNIFICANT CHANGE UP (ref 0.88–1.16)
ISTAT INR: 1.1 — SIGNIFICANT CHANGE UP (ref 0.88–1.16)
ISTAT PT: 12.7 SEC — SIGNIFICANT CHANGE UP (ref 10–12.9)
LIPID PNL WITH DIRECT LDL SERPL: 85 MG/DL — SIGNIFICANT CHANGE UP
LYMPHOCYTES # BLD AUTO: 3.48 K/UL — HIGH (ref 1–3.3)
LYMPHOCYTES # BLD AUTO: 31.4 % — SIGNIFICANT CHANGE UP (ref 13–44)
MAGNESIUM SERPL-MCNC: 2 MG/DL — SIGNIFICANT CHANGE UP (ref 1.6–2.6)
MAGNESIUM SERPL-MCNC: 2.1 MG/DL — SIGNIFICANT CHANGE UP (ref 1.6–2.6)
MCHC RBC-ENTMCNC: 28.2 PG — SIGNIFICANT CHANGE UP (ref 27–34)
MCHC RBC-ENTMCNC: 28.7 PG — SIGNIFICANT CHANGE UP (ref 27–34)
MCHC RBC-ENTMCNC: 33.3 GM/DL — SIGNIFICANT CHANGE UP (ref 32–36)
MCHC RBC-ENTMCNC: 33.7 GM/DL — SIGNIFICANT CHANGE UP (ref 32–36)
MCV RBC AUTO: 84.5 FL — SIGNIFICANT CHANGE UP (ref 80–100)
MCV RBC AUTO: 85 FL — SIGNIFICANT CHANGE UP (ref 80–100)
MONOCYTES # BLD AUTO: 0.7 K/UL — SIGNIFICANT CHANGE UP (ref 0–0.9)
MONOCYTES NFR BLD AUTO: 6.3 % — SIGNIFICANT CHANGE UP (ref 2–14)
NEUTROPHILS # BLD AUTO: 6.53 K/UL — SIGNIFICANT CHANGE UP (ref 1.8–7.4)
NEUTROPHILS NFR BLD AUTO: 58.8 % — SIGNIFICANT CHANGE UP (ref 43–77)
NON HDL CHOLESTEROL: 119 MG/DL — SIGNIFICANT CHANGE UP
NRBC # BLD: 0 /100 WBCS — SIGNIFICANT CHANGE UP (ref 0–0)
NRBC # BLD: 0 /100 WBCS — SIGNIFICANT CHANGE UP (ref 0–0)
PLATELET # BLD AUTO: 425 K/UL — HIGH (ref 150–400)
PLATELET # BLD AUTO: 431 K/UL — HIGH (ref 150–400)
POCT ISTAT CREATININE: 1.4 MG/DL — HIGH (ref 0.5–1.3)
POTASSIUM SERPL-MCNC: 4.4 MMOL/L — SIGNIFICANT CHANGE UP (ref 3.5–5.3)
POTASSIUM SERPL-MCNC: 4.7 MMOL/L — SIGNIFICANT CHANGE UP (ref 3.5–5.3)
POTASSIUM SERPL-SCNC: 4.4 MMOL/L — SIGNIFICANT CHANGE UP (ref 3.5–5.3)
POTASSIUM SERPL-SCNC: 4.7 MMOL/L — SIGNIFICANT CHANGE UP (ref 3.5–5.3)
PROT SERPL-MCNC: 8 G/DL — SIGNIFICANT CHANGE UP (ref 6–8.3)
PROTHROM AB SERPL-ACNC: 11.7 SEC — SIGNIFICANT CHANGE UP (ref 10.5–13.4)
RBC # BLD: 5.04 M/UL — SIGNIFICANT CHANGE UP (ref 4.2–5.8)
RBC # BLD: 5.2 M/UL — SIGNIFICANT CHANGE UP (ref 4.2–5.8)
RBC # FLD: 12.6 % — SIGNIFICANT CHANGE UP (ref 10.3–14.5)
RBC # FLD: 12.7 % — SIGNIFICANT CHANGE UP (ref 10.3–14.5)
SODIUM SERPL-SCNC: 138 MMOL/L — SIGNIFICANT CHANGE UP (ref 135–145)
SODIUM SERPL-SCNC: 138 MMOL/L — SIGNIFICANT CHANGE UP (ref 135–145)
TRIGL SERPL-MCNC: 170 MG/DL — HIGH
WBC # BLD: 10.01 K/UL — SIGNIFICANT CHANGE UP (ref 3.8–10.5)
WBC # BLD: 11.09 K/UL — HIGH (ref 3.8–10.5)
WBC # FLD AUTO: 10.01 K/UL — SIGNIFICANT CHANGE UP (ref 3.8–10.5)
WBC # FLD AUTO: 11.09 K/UL — HIGH (ref 3.8–10.5)

## 2022-10-07 PROCEDURE — 93010 ELECTROCARDIOGRAM REPORT: CPT

## 2022-10-07 PROCEDURE — 85027 COMPLETE CBC AUTOMATED: CPT

## 2022-10-07 PROCEDURE — 99152 MOD SED SAME PHYS/QHP 5/>YRS: CPT

## 2022-10-07 PROCEDURE — C1894: CPT

## 2022-10-07 PROCEDURE — 93459 L HRT ART/GRFT ANGIO: CPT | Mod: 59

## 2022-10-07 PROCEDURE — 83735 ASSAY OF MAGNESIUM: CPT

## 2022-10-07 PROCEDURE — 85347 COAGULATION TIME ACTIVATED: CPT

## 2022-10-07 PROCEDURE — 92920 PRQ TRLUML C ANGIOP 1ART&/BR: CPT | Mod: LD

## 2022-10-07 PROCEDURE — 85025 COMPLETE CBC W/AUTO DIFF WBC: CPT

## 2022-10-07 PROCEDURE — C1887: CPT

## 2022-10-07 PROCEDURE — 82565 ASSAY OF CREATININE: CPT

## 2022-10-07 PROCEDURE — C1725: CPT

## 2022-10-07 PROCEDURE — 80061 LIPID PANEL: CPT

## 2022-10-07 PROCEDURE — 93005 ELECTROCARDIOGRAM TRACING: CPT

## 2022-10-07 PROCEDURE — 85730 THROMBOPLASTIN TIME PARTIAL: CPT

## 2022-10-07 PROCEDURE — C1769: CPT

## 2022-10-07 PROCEDURE — 82553 CREATINE MB FRACTION: CPT

## 2022-10-07 PROCEDURE — C1753: CPT

## 2022-10-07 PROCEDURE — 83036 HEMOGLOBIN GLYCOSYLATED A1C: CPT

## 2022-10-07 PROCEDURE — 80053 COMPREHEN METABOLIC PANEL: CPT

## 2022-10-07 PROCEDURE — 85610 PROTHROMBIN TIME: CPT

## 2022-10-07 PROCEDURE — 82550 ASSAY OF CK (CPK): CPT

## 2022-10-07 PROCEDURE — 80048 BASIC METABOLIC PNL TOTAL CA: CPT

## 2022-10-07 PROCEDURE — 93010 ELECTROCARDIOGRAM REPORT: CPT | Mod: 77

## 2022-10-07 PROCEDURE — 93459 L HRT ART/GRFT ANGIO: CPT | Mod: 26,59

## 2022-10-07 PROCEDURE — C1760: CPT

## 2022-10-07 PROCEDURE — 82962 GLUCOSE BLOOD TEST: CPT

## 2022-10-07 RX ORDER — CLOPIDOGREL BISULFATE 75 MG/1
300 TABLET, FILM COATED ORAL ONCE
Refills: 0 | Status: COMPLETED | OUTPATIENT
Start: 2022-10-07 | End: 2022-10-07

## 2022-10-07 RX ORDER — SODIUM CHLORIDE 9 MG/ML
250 INJECTION INTRAMUSCULAR; INTRAVENOUS; SUBCUTANEOUS ONCE
Refills: 0 | Status: COMPLETED | OUTPATIENT
Start: 2022-10-07 | End: 2022-10-07

## 2022-10-07 RX ORDER — FENOFIBRATE,MICRONIZED 130 MG
1 CAPSULE ORAL
Qty: 0 | Refills: 0 | DISCHARGE

## 2022-10-07 RX ORDER — ASPIRIN/CALCIUM CARB/MAGNESIUM 324 MG
81 TABLET ORAL ONCE
Refills: 0 | Status: COMPLETED | OUTPATIENT
Start: 2022-10-07 | End: 2022-10-07

## 2022-10-07 RX ORDER — LISINOPRIL 2.5 MG/1
1 TABLET ORAL
Qty: 0 | Refills: 0 | DISCHARGE

## 2022-10-07 RX ORDER — ASPIRIN/CALCIUM CARB/MAGNESIUM 324 MG
1 TABLET ORAL
Qty: 30 | Refills: 11
Start: 2022-10-07 | End: 2023-10-01

## 2022-10-07 RX ORDER — ASPIRIN/CALCIUM CARB/MAGNESIUM 324 MG
325 TABLET ORAL ONCE
Refills: 0 | Status: DISCONTINUED | OUTPATIENT
Start: 2022-10-07 | End: 2022-10-07

## 2022-10-07 RX ORDER — SODIUM CHLORIDE 9 MG/ML
500 INJECTION INTRAMUSCULAR; INTRAVENOUS; SUBCUTANEOUS
Refills: 0 | Status: DISCONTINUED | OUTPATIENT
Start: 2022-10-07 | End: 2022-10-07

## 2022-10-07 RX ORDER — SODIUM CHLORIDE 9 MG/ML
500 INJECTION INTRAMUSCULAR; INTRAVENOUS; SUBCUTANEOUS
Refills: 0 | Status: DISCONTINUED | OUTPATIENT
Start: 2022-10-07 | End: 2022-10-22

## 2022-10-07 RX ORDER — CLOPIDOGREL BISULFATE 75 MG/1
1 TABLET, FILM COATED ORAL
Qty: 30 | Refills: 11
Start: 2022-10-07 | End: 2023-10-01

## 2022-10-07 RX ORDER — GLIMEPIRIDE 1 MG
1 TABLET ORAL
Qty: 0 | Refills: 0 | DISCHARGE

## 2022-10-07 RX ADMIN — SODIUM CHLORIDE 75 MILLILITER(S): 9 INJECTION INTRAMUSCULAR; INTRAVENOUS; SUBCUTANEOUS at 18:01

## 2022-10-07 RX ADMIN — SODIUM CHLORIDE 140 MILLILITER(S): 9 INJECTION INTRAMUSCULAR; INTRAVENOUS; SUBCUTANEOUS at 20:35

## 2022-10-07 RX ADMIN — Medication 81 MILLIGRAM(S): at 18:16

## 2022-10-07 RX ADMIN — SODIUM CHLORIDE 1000 MILLILITER(S): 9 INJECTION INTRAMUSCULAR; INTRAVENOUS; SUBCUTANEOUS at 18:01

## 2022-10-07 RX ADMIN — CLOPIDOGREL BISULFATE 300 MILLIGRAM(S): 75 TABLET, FILM COATED ORAL at 18:16

## 2022-10-07 NOTE — PROGRESS NOTE ADULT - SUBJECTIVE AND OBJECTIVE BOX
Interventional Cardiology PA Post PCI SDA Discharge Note      Patient without complaints. Ambulated and voided without difficulties    Afebrile, VSS    PRESCRIPTIONS/HOME MEDICATIONS:  aspirin 81 mg oral delayed release tablet: 1 tab(s) orally once a day  clopidogrel 75 mg oral tablet: 1 tab(s) orally once a day  fenofibrate 150 mg oral capsule: 1 cap(s) orally once a day  Flomax 0.4 mg oral capsule: 1 cap(s) orally once a day  icosapent 1 g oral capsule: 2 cap(s) orally 2 times a day  Januvia 100 mg oral tablet: 1 tab(s) orally once a day  Jardiance 10 mg oral tablet: 1 tab(s) orally once a day (in the morning)  lisinopril 20 mg oral tablet: 1 tab(s) orally once a day  metFORMIN 1000 mg oral tablet: 1 tab(s) orally 2 times a day  Metoprolol Succinate ER 50 mg oral tablet, extended release: 1 tab(s) orally once a day  simvastatin 20 mg oral tablet: 1 tab(s) orally once a day (at bedtime)        CURRENT MEDICATIONS:   chlorhexidine 4% Liquid 1 Application(s) Topical once        Ext:  Right Groin: no hematoma, no bruit, dressing; C/D/I    Pulses:  intact RAD to baseline     A/P:  s/p PTCA mLAD ISR, EDP 18mmHg     1. Follow-up with PMD/Cardiologist Dr. Ashford in 72 hours.  2. Post procedure labs/EKG reviewed and stable.    3. Pt given instructions on importance of taking antiplatelet medication.    4. Stable for discharge as per attending Dr. Crane after bed rest, pt voids, groin/wrist stable and 30 minutes of ambulation.  5. Prescriptions for Aspirin/Plavix e-prescribed and submitted to patient's pharmacy Yes  6. Patient will continue statin Simvastatin 20mg daily, Vascepa 2G PO BID and Fenofibrate 150mg PO QD.   7. Patient will continue All Other Home Medications.   8. Discharge forms signed and copies in chart   9. Discharge Order Entered Yes    Interventional Cardiology PA Post PCI SDA Discharge Note      Patient without complaints. Ambulated and voided without difficulties    Afebrile, VSS    PRESCRIPTIONS/HOME MEDICATIONS:  aspirin 81 mg oral delayed release tablet: 1 tab(s) orally once a day  clopidogrel 75 mg oral tablet: 1 tab(s) orally once a day  fenofibrate 150 mg oral capsule: 1 cap(s) orally once a day  Flomax 0.4 mg oral capsule: 1 cap(s) orally once a day  icosapent 1 g oral capsule: 2 cap(s) orally 2 times a day  Januvia 100 mg oral tablet: 1 tab(s) orally once a day  Jardiance 10 mg oral tablet: 1 tab(s) orally once a day (in the morning)  lisinopril 20 mg oral tablet: 1 tab(s) orally once a day  metFORMIN 1000 mg oral tablet: 1 tab(s) orally 2 times a day  Metoprolol Succinate ER 50 mg oral tablet, extended release: 1 tab(s) orally once a day  simvastatin 20 mg oral tablet: 1 tab(s) orally once a day (at bedtime)        CURRENT MEDICATIONS:   chlorhexidine 4% Liquid 1 Application(s) Topical once        Ext:  Right Groin: no hematoma, no bruit, dressing; C/D/I    Pulses:  intact RAD to baseline     A/P:  s/p PTCA mLAD ISR (70-80%) pLAD 30%, mLCx  vills via SVG, EDP 18mmHg     1. Follow-up with PMD/Cardiologist Dr. Ashford in 72 hours.  2. Post procedure labs/EKG reviewed and stable.    3. Pt given instructions on importance of taking antiplatelet medication.    4. Stable for discharge as per attending Dr. Crane after bed rest, pt voids, groin/wrist stable and 30 minutes of ambulation.  5. Prescriptions for Aspirin/Plavix e-prescribed and submitted to patient's pharmacy Yes  6. Patient will continue statin Simvastatin 20mg daily, Vascepa 2G PO BID and Fenofibrate 150mg PO QD.   7. Patient will continue All Other Home Medications.   8. Discharge forms signed and copies in chart   9. Discharge Order Entered Yes

## 2022-10-11 DIAGNOSIS — I25.710 ATHEROSCLEROSIS OF AUTOLOGOUS VEIN CORONARY ARTERY BYPASS GRAFT(S) WITH UNSTABLE ANGINA PECTORIS: ICD-10-CM
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
## 2022-10-11 DIAGNOSIS — R94.39 ABNORMAL RESULT OF OTHER CARDIOVASCULAR FUNCTION STUDY: ICD-10-CM

## 2022-10-11 DIAGNOSIS — T82.855A STENOSIS OF CORONARY ARTERY STENT, INITIAL ENCOUNTER: ICD-10-CM

## 2022-10-21 LAB — ISTAT ACTK (ACTIVATED CLOTTING TIME KAOLIN): 404 SEC — HIGH (ref 74–137)

## 2023-11-08 VITALS
RESPIRATION RATE: 16 BRPM | SYSTOLIC BLOOD PRESSURE: 134 MMHG | OXYGEN SATURATION: 97 % | HEART RATE: 65 BPM | HEIGHT: 66.14 IN | TEMPERATURE: 98 F | WEIGHT: 165.35 LBS | DIASTOLIC BLOOD PRESSURE: 69 MMHG

## 2023-11-08 RX ORDER — SODIUM CHLORIDE 9 MG/ML
1000 INJECTION, SOLUTION INTRAVENOUS
Refills: 0 | Status: DISCONTINUED | OUTPATIENT
Start: 2023-11-10 | End: 2023-11-24

## 2023-11-08 RX ORDER — GLUCAGON INJECTION, SOLUTION 0.5 MG/.1ML
1 INJECTION, SOLUTION SUBCUTANEOUS ONCE
Refills: 0 | Status: DISCONTINUED | OUTPATIENT
Start: 2023-11-10 | End: 2023-11-24

## 2023-11-08 RX ORDER — DEXTROSE 50 % IN WATER 50 %
12.5 SYRINGE (ML) INTRAVENOUS ONCE
Refills: 0 | Status: DISCONTINUED | OUTPATIENT
Start: 2023-11-10 | End: 2023-11-24

## 2023-11-08 RX ORDER — SODIUM CHLORIDE 9 MG/ML
500 INJECTION INTRAMUSCULAR; INTRAVENOUS; SUBCUTANEOUS
Refills: 0 | Status: DISCONTINUED | OUTPATIENT
Start: 2023-11-10 | End: 2023-11-24

## 2023-11-08 RX ORDER — DEXTROSE 50 % IN WATER 50 %
15 SYRINGE (ML) INTRAVENOUS ONCE
Refills: 0 | Status: DISCONTINUED | OUTPATIENT
Start: 2023-11-10 | End: 2023-11-24

## 2023-11-08 RX ORDER — INSULIN LISPRO 100/ML
VIAL (ML) SUBCUTANEOUS
Refills: 0 | Status: DISCONTINUED | OUTPATIENT
Start: 2023-11-10 | End: 2023-11-24

## 2023-11-08 RX ORDER — DEXTROSE 50 % IN WATER 50 %
25 SYRINGE (ML) INTRAVENOUS ONCE
Refills: 0 | Status: DISCONTINUED | OUTPATIENT
Start: 2023-11-10 | End: 2023-11-24

## 2023-11-08 RX ORDER — CHLORHEXIDINE GLUCONATE 213 G/1000ML
1 SOLUTION TOPICAL ONCE
Refills: 0 | Status: DISCONTINUED | OUTPATIENT
Start: 2023-11-10 | End: 2023-11-24

## 2023-11-08 NOTE — H&P ADULT - NSICDXPASTSURGICALHX_GEN_ALL_CORE_FT
PAST SURGICAL HISTORY:  CABG (Coronary Artery Bypass Graft) [facility], [place] 2000 (LIMA to LAD, SVG to OM1)    History of prostate surgery      PAST SURGICAL HISTORY:  CABG (Coronary Artery Bypass Graft) [facility], [place] 2000 (LIMA to LAD, SVG to OM1)    History of prostate surgery      PAST SURGICAL HISTORY:  CABG (Coronary Artery Bypass Graft) [facility], [place] 2000 (LIMA to LAD, SVG to OM1)    History of prostate surgery

## 2023-11-08 NOTE — H&P ADULT - ASSESSMENT
77yo Maori-speaking Male, former smoker, w/ PMHx of HTN, HLD, DM T2, BPH, CAD (s/p CABG (LIMA-LAD and SVG -OM1) and PCI's,  who presents for cardiac cath due to patient's risk factors, CCS Angina Class III Symptoms and abnormal CCTA.    ASA III          Mallampati III  Patient is a candidate for sedation: yes  Sedation: Moderate    Risks & benefits of procedure and alternative therapy have been explained to the patient including but not limited to: allergic reaction, bleeding w/possible need for blood transfusion, infection, renal and vascular compromise, limb damage, arrhythmia, stroke, vessel dissection/perforation, Myocardial infarction, emergent CABG. Informed consent obtained and in chart using Maori  Will ID #[id number]    Hgb/HCT normal (16.1/47.6) and Platelets normal (373,000). Patient denies bleeding, BRBPR, melena, hematuria, hematemesis. Patient is currently on Aspirin EC  81 mg PO Daily and Plavix 75 mg PO Daily (last dose of each 11/9/23) and reports compliance. ASA EC 81 mg PO x 1 and Plavix 75 mg PO x 1 given prior to procedure.      EF normal by echo 7/11/23. Patient euvolemic on exam and has no history of CHF.   cc Bolus IV x 1 followed by NS 75 cc/hr x 2 hrs per Hydration Protocol.  75yo Greenlandic-speaking Male, former smoker, w/ PMHx of HTN, HLD, DM T2, BPH, CAD (s/p CABG (LIMA-LAD and SVG -OM1) and PCI's,  who presents for cardiac cath due to patient's risk factors, CCS Angina Class III Symptoms and abnormal CCTA.    ASA III          Mallampati III  Patient is a candidate for sedation: yes  Sedation: Moderate    Risks & benefits of procedure and alternative therapy have been explained to the patient including but not limited to: allergic reaction, bleeding w/possible need for blood transfusion, infection, renal and vascular compromise, limb damage, arrhythmia, stroke, vessel dissection/perforation, Myocardial infarction, emergent CABG. Informed consent obtained and in chart using Greenlandic  Will ID #[id number]    Hgb/HCT normal (16.1/47.6) and Platelets normal (373,000). Patient denies bleeding, BRBPR, melena, hematuria, hematemesis. Patient is currently on Aspirin EC  81 mg PO Daily and Plavix 75 mg PO Daily (last dose of each 11/9/23) and reports compliance. ASA EC 81 mg PO x 1 and Plavix 75 mg PO x 1 given prior to procedure.      EF normal by echo 7/11/23. Patient euvolemic on exam and has no history of CHF.   cc Bolus IV x 1 followed by NS 75 cc/hr x 2 hrs per Hydration Protocol.  75yo Mohawk-speaking Male, former smoker, w/ PMHx of HTN, HLD, DM T2, BPH, CAD (s/p CABG (LIMA-LAD and SVG -OM1) and PCI's,  who presents for cardiac cath due to patient's risk factors, CCS Angina Class III Symptoms and abnormal CCTA.    ASA III          Mallampati III  Patient is a candidate for sedation: yes  Sedation: Moderate    Risks & benefits of procedure and alternative therapy have been explained to the patient including but not limited to: allergic reaction, bleeding w/possible need for blood transfusion, infection, renal and vascular compromise, limb damage, arrhythmia, stroke, vessel dissection/perforation, Myocardial infarction, emergent CABG. Informed consent obtained and in chart using Mohawk  Will ID #[id number]    Hgb/HCT normal (16.1/47.6) and Platelets normal (373,000). Patient denies bleeding, BRBPR, melena, hematuria, hematemesis. Patient is currently on Aspirin EC  81 mg PO Daily and Plavix 75 mg PO Daily (last dose of each 11/9/23) and reports compliance. ASA EC 81 mg PO x 1 and Plavix 75 mg PO x 1 given prior to procedure.      EF normal by echo 7/11/23. Patient euvolemic on exam and has no history of CHF.   cc Bolus IV x 1 followed by NS 75 cc/hr x 2 hrs per Hydration Protocol.  75yo Kyrgyz-speaking Male, former smoker, w/ PMHx of HTN, HLD, DM T2, BPH, CAD (s/p CABG (LIMA-LAD and SVG -OM1) and PCI's,  CKD Stage III (Cr 1.37 on admission, Cr 1.25-1.36 October 2022) who presents for cardiac cath due to patient's risk factors, CCS Angina Class III Symptoms and abnormal CCTA.    ASA III          Mallampati III  Patient is a candidate for sedation: yes  Sedation: Moderate    Risks & benefits of procedure and alternative therapy have been explained to the patient including but not limited to: allergic reaction, bleeding w/possible need for blood transfusion, infection, renal and vascular compromise, limb damage, arrhythmia, stroke, vessel dissection/perforation, Myocardial infarction, emergent CABG. Informed consent obtained and in chart using Kyrgyz  Will ID #[id number]    Hgb/HCT normal (16.1/47.6) and Platelets normal (373,000). Patient denies bleeding, BRBPR, melena, hematuria, hematemesis. Patient is currently on Aspirin EC  81 mg PO Daily and Plavix 75 mg PO Daily (last dose of each 11/9/23) and reports compliance. ASA EC 81 mg PO x 1 and Plavix 75 mg PO x 1 given prior to procedure.      EF normal by echo 7/11/23. Patient euvolemic on exam and has no history of CHF.   cc Bolus IV x 1 followed by NS 75 cc/hr x 2 hrs per Hydration Protocol.    CKD Stage III Cr 1.37-Cr 1.25-Cr 1.36 in October 2022. Patient being hydrated as per protocol. IC Fellow Dr. Starkey aware 75yo Icelandic-speaking Male, former smoker, w/ PMHx of HTN, HLD, DM T2, BPH, CAD (s/p CABG (LIMA-LAD and SVG -OM1) and PCI's,  CKD Stage III (Cr 1.37 on admission, Cr 1.25-1.36 October 2022) who presents for cardiac cath due to patient's risk factors, CCS Angina Class III Symptoms and abnormal CCTA.    ASA III          Mallampati III  Patient is a candidate for sedation: yes  Sedation: Moderate    Risks & benefits of procedure and alternative therapy have been explained to the patient including but not limited to: allergic reaction, bleeding w/possible need for blood transfusion, infection, renal and vascular compromise, limb damage, arrhythmia, stroke, vessel dissection/perforation, Myocardial infarction, emergent CABG. Informed consent obtained and in chart using Icelandic  Will ID #[id number]    Hgb/HCT normal (16.1/47.6) and Platelets normal (373,000). Patient denies bleeding, BRBPR, melena, hematuria, hematemesis. Patient is currently on Aspirin EC  81 mg PO Daily and Plavix 75 mg PO Daily (last dose of each 11/9/23) and reports compliance. ASA EC 81 mg PO x 1 and Plavix 75 mg PO x 1 given prior to procedure.      EF normal by echo 7/11/23. Patient euvolemic on exam and has no history of CHF.   cc Bolus IV x 1 followed by NS 75 cc/hr x 2 hrs per Hydration Protocol.    CKD Stage III Cr 1.37-Cr 1.25-Cr 1.36 in October 2022. Patient being hydrated as per protocol. IC Fellow Dr. Starkey aware 77yo Kinyarwanda-speaking Male, former smoker, w/ PMHx of HTN, HLD, DM T2, BPH, CAD (s/p CABG (LIMA-LAD and SVG -OM1) and PCI's,  CKD Stage III (Cr 1.37 on admission, Cr 1.25-1.36 October 2022) who presents for cardiac cath due to patient's risk factors, CCS Angina Class III Symptoms and abnormal CCTA.    ASA III          Mallampati III  Patient is a candidate for sedation: yes  Sedation: Moderate    Risks & benefits of procedure and alternative therapy have been explained to the patient including but not limited to: allergic reaction, bleeding w/possible need for blood transfusion, infection, renal and vascular compromise, limb damage, arrhythmia, stroke, vessel dissection/perforation, Myocardial infarction, emergent CABG. Informed consent obtained and in chart using Kinyarwanda  Will ID #[id number]    Hgb/HCT normal (16.1/47.6) and Platelets normal (373,000). Patient denies bleeding, BRBPR, melena, hematuria, hematemesis. Patient is currently on Aspirin EC  81 mg PO Daily and Plavix 75 mg PO Daily (last dose of each 11/9/23) and reports compliance. ASA EC 81 mg PO x 1 and Plavix 75 mg PO x 1 given prior to procedure.      EF normal by echo 7/11/23. Patient euvolemic on exam and has no history of CHF.   cc Bolus IV x 1 followed by NS 75 cc/hr x 2 hrs per Hydration Protocol.    CKD Stage III Cr 1.37-Cr 1.25-Cr 1.36 in October 2022. Patient being hydrated as per protocol. IC Fellow Dr. Starkey aware

## 2023-11-08 NOTE — H&P ADULT - NSHPLABSRESULTS_GEN_ALL_CORE
16.1   9.58  )-----------( 373      ( 10 Nov 2023 08:12 )             47.6         Mg     2.1     11-10        PT/INR - ( 10 Nov 2023 08:12 )   PT: 10.0 sec;   INR: 0.87          PTT - ( 10 Nov 2023 08:12 )  PTT:36.7 sec              EKG ([facility] 10/23/23) : NSR with RBBB. 0.5 mm ST elevation III, III, AVF. TWI AVL. TWI V2-V3 16.1   9.58  )-----------( 373      ( 10 Nov 2023 08:12 )             47.6         Mg     2.1     11-10        PT/INR - ( 10 Nov 2023 08:12 )   PT: 10.0 sec;   INR: 0.87          PTT - ( 10 Nov 2023 08:12 )  PTT:36.7 sec              EKG ([facility] 10/23/23) : NSR with RBBB. 0.5 mm ST elevation III, III, AVF. TWI AVL. TWI V2-V3 16.1   9.58  )-----------( 373      ( 10 Nov 2023 08:12 )             47.6         Mg     2.1     11-10        PT/INR - ( 10 Nov 2023 08:12 )   PT: 10.0 sec;   INR: 0.87          PTT - ( 10 Nov 2023 08:12 )  PTT:36.7 sec              EKG ([facility] 10/23/23) : NSR with RBBB. 0.5 mm ST elevation III, III, AVF. TWI AVL. TWI V2-V3

## 2023-11-08 NOTE — H&P ADULT - HISTORY OF PRESENT ILLNESS
INCOMPLETE    Cardiologist: Dr Ashford  Pharmacy:   Escort:    75yo Armenian-speaking Male, former smoker, w/ PMHx of HTN, HLD, DM T2, BPH, CAD (s/p CABG and PCI's, see below) who presented to his cardiologist, Dr. Ashford, c/o intermittent, midline, substernal, exertional, progressive CP not relieved wtih rest. CP is increasing in severity and duration. Denies SOB, MONTAGUE, palpitations, dizziness, LOC, N/V/D, fever/chills/sick contact, diaphoresis, orthopnea/PND, and leg swelling. CCTA 11/3/23: persistent occlusion of LIMA-LAD grafte; patent SVG-LCx; mLAD stents patent. TTE 7/11/23: EF 62%; left atrial cavity severely dilated. In light of patient's risk factors, worsening CCS Class III anginal symptoms, and abnormal CCTA, patient now presents to [facility] for cardiac catheterization with possible intervention if clinically indicated.    s/p cardiac Cath 9/18/20: LORENA x2 mLAD; LM patent, mLCx  (fills via SVG), RCA 30-50%, SVG-OM1 patent, LIMA-LAD atretic.   s/p cardiac cath 10/7/23: PTCA mLAD ISR (70-80%) pLAD 30%, mLCx  fills via SVG INCOMPLETE    Cardiologist: Dr Ashford  Pharmacy:   Escort:    77yo Vietnamese-speaking Male, former smoker, w/ PMHx of HTN, HLD, DM T2, BPH, CAD (s/p CABG and PCI's, see below) who presented to his cardiologist, Dr. Ashford, c/o intermittent, midline, substernal, exertional, progressive CP not relieved wtih rest. CP is increasing in severity and duration. Denies SOB, MONTAG[place], palpitations, dizziness, LOC, N/V/D, fever/chills/sick contact, diaphoresis, orthopnea/PND, and leg swelling. CCTA 11/3/23: persistent occlusion of LIMA-LAD grafte; patent SVG-LCx; mLAD stents patent. TTE 7/11/23: EF 62%; left atrial cavity severely dilated. In light of patient's risk factors, worsening CCS Class III anginal symptoms, and abnormal CCTA, patient now presents to [facility] for cardiac catheterization with possible intervention if clinically indicated.    s/p cardiac Cath 9/18/20: LORENA x2 mLAD; LM patent, mLCx  (fills via SVG), RCA 30-50%, SVG-OM1 patent, LIMA-LAD atretic.   s/p cardiac cath 10/7/23: PTCA mLAD ISR (70-80%) pLAD 30%, mLCx  fills via SVG INCOMPLETE    Cardiologist: Dr Ashford  Pharmacy:   Escort:    75yo Armenian-speaking Male, former smoker, w/ PMHx of HTN, HLD, DM T2, BPH, CAD (s/p CABG and PCI's, see below) who presented to his cardiologist, Dr. Ashford, c/o intermittent, midline, substernal, exertional, progressive CP not relieved wtih rest. CP is increasing in severity and duration. Denies SOB, MONTAG[place], palpitations, dizziness, LOC, N/V/D, fever/chills/sick contact, diaphoresis, orthopnea/PND, and leg swelling. CCTA 11/3/23: persistent occlusion of LIMA-LAD grafte; patent SVG-LCx; mLAD stents patent. TTE 7/11/23: EF 62%; left atrial cavity severely dilated. In light of patient's risk factors, worsening CCS Class III anginal symptoms, and abnormal CCTA, patient now presents to [facility] for cardiac catheterization with possible intervention if clinically indicated.    s/p cardiac Cath 9/18/20: LORENA x2 mLAD; LM patent, mLCx  (fills via SVG), RCA 30-50%, SVG-OM1 patent, LIMA-LAD atretic.   s/p cardiac cath 10/7/23: PTCA mLAD ISR (70-80%) pLAD 30%, mLCx  fills via SVG     Cardiologist: Dr Ashford  Pharmacy: [facility] (in system) -medications obtained from bottles brought in by patient and confirmed with patient at bedside  Escort: None    77yo German-speaking Male, former smoker, w/ PMHx of HTN, HLD, DM T2, BPH, CAD (s/p CABG and PCI's, see below) who presented to his cardiologist, Dr. Ashford, c/o intermittent, midline, substernal, exertional, progressive CP not relieved wtih rest. CP is increasing in severity and duration. Denies SOB, MONTAG[place], palpitations, dizziness, LOC, N/V/D, fever/chills/sick contact, diaphoresis, orthopnea/PND, and leg swelling. CCTA 11/3/23: persistent occlusion of LIMA-LAD grafte; patent SVG-LCx; mLAD stents patent. TTE 7/11/23: EF 62%; left atrial cavity severely dilated. In light of patient's risk factors, worsening CCS Class III anginal symptoms, and abnormal CCTA, patient now presents to [facility] for cardiac catheterization with possible intervention if clinically indicated.    s/p cardiac Cath 9/18/20: LORENA x2 mLAD; LM patent, mLCx  (fills via SVG), RCA 30-50%, SVG-OM1 patent, LIMA-LAD atretic.   s/p cardiac cath 10/7/23: PTCA mLAD ISR (70-80%) pLAD 30%, mLCx  fills via SVG     Cardiologist: Dr Ashford  Pharmacy: [facility] (in system) -medications obtained from bottles brought in by patient and confirmed with patient at bedside  Escort: None    77yo French-speaking Male, former smoker, w/ PMHx of HTN, HLD, DM T2, BPH, CAD (s/p CABG and PCI's, see below) who presented to his cardiologist, Dr. Ashford, c/o intermittent, midline, substernal, exertional, progressive CP not relieved wtih rest. CP is increasing in severity and duration. Denies SOB, MONTAG[place], palpitations, dizziness, LOC, N/V/D, fever/chills/sick contact, diaphoresis, orthopnea/PND, and leg swelling. CCTA 11/3/23: persistent occlusion of LIMA-LAD grafte; patent SVG-LCx; mLAD stents patent. TTE 7/11/23: EF 62%; left atrial cavity severely dilated. In light of patient's risk factors, worsening CCS Class III anginal symptoms, and abnormal CCTA, patient now presents to [facility] for cardiac catheterization with possible intervention if clinically indicated.    s/p cardiac Cath 9/18/20: LORENA x2 mLAD; LM patent, mLCx  (fills via SVG), RCA 30-50%, SVG-OM1 patent, LIMA-LAD atretic.   s/p cardiac cath 10/7/23: PTCA mLAD ISR (70-80%) pLAD 30%, mLCx  fills via SVG     Cardiologist: Dr Ashford  Pharmacy: [facility] (in system) -medications obtained from bottles brought in by patient and confirmed with patient at bedside  Escort: None    77yo Pashto-speaking Male, former smoker, w/ PMHx of HTN, HLD, DM T2, BPH, CAD (s/p CABG and PCI's, see below) who presented to his cardiologist, Dr. Ashford, c/o intermittent, midline, substernal, exertional, progressive CP not relieved wtih rest. CP is increasing in severity and duration. Denies SOB, M[place], palpitations, dizziness, LOC, N/V/D, fever/chills/sick contact, diaphoresis, orthopnea/PND, and leg swelling. CCTA 11/3/23: persistent occlusion of LIMA-LAD grafte; patent SVG-LCx; mLAD stents patent. TTE 7/11/23: EF 62%; left atrial cavity severely dilated. In light of patient's risk factors, worsening CCS Class III anginal symptoms, and abnormal CCTA, patient now presents to [facility] for cardiac catheterization with possible intervention if clinically indicated.    s/p cardiac Cath 9/18/20: LORENA x2 mLAD; LM patent, mLCx  (fills via SVG), RCA 30-50%, SVG-OM1 patent, LIMA-LAD atretic.   s/p cardiac cath 10/7/23: PTCA mLAD ISR (70-80%) pLAD 30%, mLCx  fills via SVG     Cardiologist: Dr Ashford  Pharmacy: [facility] (in system) -medications obtained from bottles brought in by patient and confirmed with patient at bedside  Escort: None    75yo Yakut-speaking Male, former smoker, w/ PMHx of HTN, HLD, DM T2, BPH, CAD (s/p CABG and PCI's, see below), CKD Stage III (Cr 1.37 on admission, Cr 1.25-1.36 October 2022) who presented to his cardiologist, Dr. Ashford, c/o intermittent, midline, substernal, exertional, progressive CP not relieved wtih rest. CP is increasing in severity and duration. Denies SOB, M[place], palpitations, dizziness, LOC, N/V/D, fever/chills/sick contact, diaphoresis, orthopnea/PND, and leg swelling. CCTA 11/3/23: persistent occlusion of LIMA-LAD grafte; patent SVG-LCx; mLAD stents patent. TTE 7/11/23: EF 62%; left atrial cavity severely dilated. In light of patient's risk factors, worsening CCS Class III anginal symptoms, and abnormal CCTA, patient now presents to [facility] for cardiac catheterization with possible intervention if clinically indicated.    s/p cardiac Cath 9/18/20: LORENA x2 mLAD; LM patent, mLCx  (fills via SVG), RCA 30-50%, SVG-OM1 patent, LIMA-LAD atretic.   s/p cardiac cath 10/7/23: PTCA mLAD ISR (70-80%) pLAD 30%, mLCx  fills via SVG     Cardiologist: Dr Ashford  Pharmacy: [facility] (in system) -medications obtained from bottles brought in by patient and confirmed with patient at bedside  Escort: None    75yo Thai-speaking Male, former smoker, w/ PMHx of HTN, HLD, DM T2, BPH, CAD (s/p CABG and PCI's, see below), CKD Stage III (Cr 1.37 on admission, Cr 1.25-1.36 October 2022) who presented to his cardiologist, Dr. Ashford, c/o intermittent, midline, substernal, exertional, progressive CP not relieved wtih rest. CP is increasing in severity and duration. Denies SOB, M[place], palpitations, dizziness, LOC, N/V/D, fever/chills/sick contact, diaphoresis, orthopnea/PND, and leg swelling. CCTA 11/3/23: persistent occlusion of LIMA-LAD grafte; patent SVG-LCx; mLAD stents patent. TTE 7/11/23: EF 62%; left atrial cavity severely dilated. In light of patient's risk factors, worsening CCS Class III anginal symptoms, and abnormal CCTA, patient now presents to [facility] for cardiac catheterization with possible intervention if clinically indicated.    s/p cardiac Cath 9/18/20: LORENA x2 mLAD; LM patent, mLCx  (fills via SVG), RCA 30-50%, SVG-OM1 patent, LIMA-LAD atretic.   s/p cardiac cath 10/7/23: PTCA mLAD ISR (70-80%) pLAD 30%, mLCx  fills via SVG     Cardiologist: Dr Ashford  Pharmacy: [facility] (in system) -medications obtained from bottles brought in by patient and confirmed with patient at bedside  Escort: None    75yo Sinhala-speaking Male, former smoker, w/ PMHx of HTN, HLD, DM T2, BPH, CAD (s/p CABG and PCI's, see below), CKD Stage III (Cr 1.37 on admission, Cr 1.25-1.36 October 2022) who presented to his cardiologist, Dr. Ashford, c/o intermittent, midline, substernal, exertional, progressive CP not relieved wtih rest. CP is increasing in severity and duration. Denies SOB, M[place], palpitations, dizziness, LOC, N/V/D, fever/chills/sick contact, diaphoresis, orthopnea/PND, and leg swelling. CCTA 11/3/23: persistent occlusion of LIMA-LAD grafte; patent SVG-LCx; mLAD stents patent. TTE 7/11/23: EF 62%; left atrial cavity severely dilated. In light of patient's risk factors, worsening CCS Class III anginal symptoms, and abnormal CCTA, patient now presents to [facility] for cardiac catheterization with possible intervention if clinically indicated.    s/p cardiac Cath 9/18/20: LORENA x2 mLAD; LM patent, mLCx  (fills via SVG), RCA 30-50%, SVG-OM1 patent, LIMA-LAD atretic.   s/p cardiac cath 10/7/23: PTCA mLAD ISR (70-80%) pLAD 30%, mLCx  fills via SVG

## 2023-11-08 NOTE — H&P ADULT - ADDITIONAL PE
Maddie Arroyo MD   vitamin B-12 (CYANOCOBALAMIN) 500 MCG tablet Take 1 tablet by mouth daily Yes Mikel Bocanegra MD   escitalopram (LEXAPRO) 5 MG tablet Take 5 mg by mouth daily Yes Historical Provider, MD   acetaminophen (TYLENOL) 325 MG tablet Take 2 tablets by mouth every 4 hours as needed for Pain Yes Billy Donald MD   aspirin 81 MG tablet Take 81 mg by mouth daily Yes Historical Provider, MD   Multiple Vitamins-Minerals (THERAPEUTIC MULTIVITAMIN-MINERALS) tablet Take 1 tablet by mouth daily Yes Mikel Bocanegra MD       ROS: COMPREHENSIVE ROS AS IN HX, REST -VE  History obtained from chart review and the patient    OBJECTIVE:   NURSING NOTE AND VITALS REVIEWED  /80   Pulse 63   Temp 98.6 °F (37 °C)   Ht 5' 6\" (1.676 m)   Wt 143 lb 9.6 oz (65.1 kg)   SpO2 95%   Breastfeeding? No   BMI 23.18 kg/m²     NO ACUTE DISTRESS    REPEAT BP: 130/80 (RT), NO ORTHOSTASIS     Body mass index is 23.18 kg/m². HEENT: NO PALLOR, ANICTERIC, PERRLA, EOMI, NO CONJUNCTIVAL ERYTHEMA,                 NO SINUS TENDERNESS, NO CERUMEN IMPACTION, NO TM ERYTHEMA  NECK:  SUPPLE, TRACHEA MIDLINE, NT, NO JVD, NO CB, NO LA, NO TM, NO STIFFNESS  CHEST: RESPY EFFORT NL, GOOD AE, NO W/R/C  HEART: S1S2+ REG, NO M/G/R  ABD: SOFT, NT, NO HSM, BS+  EXT: NO EDEMA, NT, PULSES +. TWAN'S -VE  NEURO: ALERT AND ORIENTED X 3, NO MENINGEAL SIGNS, NO TREMORS, NL GAIT, NO FOCAL DEFICITS  PSYCH: FAIRLY GOOD AFFECT  BACK: NT, NO ROM, NO CVA TENDERNESS  SKIN: NO BRUISING  SHOULDERS: NT, NO ROM      PREVIOUS LABS  REVIEWED AND D/W PT       ASSESSMENT / PLAN:     Diagnosis Orders   1. Essential hypertension  COUNSELLED. CONTINUE MEDS. ADVISED LOW NA+ / DASH DIET/ EXERCISE. MONITOR. GOAL </= 120/80  F/U LABS  MAKE CHANGES AS NEEDED. 2. Dizziness / Vertigo  COUNSELLED. LABS TO EVAL. ADVISED GRADUAL POSITION CHANGE. ADVISED MECLIZINE PRN. MAINTAIN HYDRATION. MONITOR. MAKE CHANGES AS NEEDED. 3. Other hyperlipidemia  COUNSELLED.  NOT Well healed sternotomy scar

## 2023-11-10 ENCOUNTER — OUTPATIENT (OUTPATIENT)
Dept: OUTPATIENT SERVICES | Facility: HOSPITAL | Age: 78
LOS: 1 days | Discharge: HOME | End: 2023-11-10
Payer: MEDICARE

## 2023-11-10 DIAGNOSIS — Z98.890 OTHER SPECIFIED POSTPROCEDURAL STATES: Chronic | ICD-10-CM

## 2023-11-10 LAB
A1C WITH ESTIMATED AVERAGE GLUCOSE RESULT: 14.1 % — HIGH (ref 4–5.6)
A1C WITH ESTIMATED AVERAGE GLUCOSE RESULT: 14.1 % — HIGH (ref 4–5.6)
ALBUMIN SERPL ELPH-MCNC: 4.5 G/DL — SIGNIFICANT CHANGE UP (ref 3.3–5)
ALBUMIN SERPL ELPH-MCNC: 4.5 G/DL — SIGNIFICANT CHANGE UP (ref 3.3–5)
ALP SERPL-CCNC: 81 U/L — SIGNIFICANT CHANGE UP (ref 40–120)
ALP SERPL-CCNC: 81 U/L — SIGNIFICANT CHANGE UP (ref 40–120)
ALT FLD-CCNC: 28 U/L — SIGNIFICANT CHANGE UP (ref 10–45)
ALT FLD-CCNC: 28 U/L — SIGNIFICANT CHANGE UP (ref 10–45)
ANION GAP SERPL CALC-SCNC: 11 MMOL/L — SIGNIFICANT CHANGE UP (ref 5–17)
ANION GAP SERPL CALC-SCNC: 11 MMOL/L — SIGNIFICANT CHANGE UP (ref 5–17)
ANION GAP SERPL CALC-SCNC: 17 MMOL/L — SIGNIFICANT CHANGE UP (ref 5–17)
ANION GAP SERPL CALC-SCNC: 17 MMOL/L — SIGNIFICANT CHANGE UP (ref 5–17)
APTT BLD: 36.7 SEC — HIGH (ref 24.5–35.6)
APTT BLD: 36.7 SEC — HIGH (ref 24.5–35.6)
AST SERPL-CCNC: 30 U/L — SIGNIFICANT CHANGE UP (ref 10–40)
AST SERPL-CCNC: 30 U/L — SIGNIFICANT CHANGE UP (ref 10–40)
BASE EXCESS BLDV CALC-SCNC: -2.2 MMOL/L — LOW (ref -2–3)
BASE EXCESS BLDV CALC-SCNC: -2.2 MMOL/L — LOW (ref -2–3)
BASOPHILS # BLD AUTO: 0.06 K/UL — SIGNIFICANT CHANGE UP (ref 0–0.2)
BASOPHILS # BLD AUTO: 0.06 K/UL — SIGNIFICANT CHANGE UP (ref 0–0.2)
BASOPHILS NFR BLD AUTO: 0.6 % — SIGNIFICANT CHANGE UP (ref 0–2)
BASOPHILS NFR BLD AUTO: 0.6 % — SIGNIFICANT CHANGE UP (ref 0–2)
BILIRUB SERPL-MCNC: 0.5 MG/DL — SIGNIFICANT CHANGE UP (ref 0.2–1.2)
BILIRUB SERPL-MCNC: 0.5 MG/DL — SIGNIFICANT CHANGE UP (ref 0.2–1.2)
BUN SERPL-MCNC: 27 MG/DL — HIGH (ref 7–23)
BUN SERPL-MCNC: 27 MG/DL — HIGH (ref 7–23)
BUN SERPL-MCNC: 29 MG/DL — HIGH (ref 7–23)
BUN SERPL-MCNC: 29 MG/DL — HIGH (ref 7–23)
CA-I SERPL-SCNC: 1.29 MMOL/L — SIGNIFICANT CHANGE UP (ref 1.15–1.33)
CA-I SERPL-SCNC: 1.29 MMOL/L — SIGNIFICANT CHANGE UP (ref 1.15–1.33)
CALCIUM SERPL-MCNC: 10.3 MG/DL — SIGNIFICANT CHANGE UP (ref 8.4–10.5)
CALCIUM SERPL-MCNC: 10.3 MG/DL — SIGNIFICANT CHANGE UP (ref 8.4–10.5)
CALCIUM SERPL-MCNC: 9.2 MG/DL — SIGNIFICANT CHANGE UP (ref 8.4–10.5)
CALCIUM SERPL-MCNC: 9.2 MG/DL — SIGNIFICANT CHANGE UP (ref 8.4–10.5)
CHLORIDE SERPL-SCNC: 100 MMOL/L — SIGNIFICANT CHANGE UP (ref 96–108)
CHLORIDE SERPL-SCNC: 100 MMOL/L — SIGNIFICANT CHANGE UP (ref 96–108)
CHLORIDE SERPL-SCNC: 101 MMOL/L — SIGNIFICANT CHANGE UP (ref 96–108)
CHLORIDE SERPL-SCNC: 101 MMOL/L — SIGNIFICANT CHANGE UP (ref 96–108)
CHOLEST SERPL-MCNC: 166 MG/DL — SIGNIFICANT CHANGE UP
CHOLEST SERPL-MCNC: 166 MG/DL — SIGNIFICANT CHANGE UP
CK MB CFR SERPL CALC: 4.4 NG/ML — SIGNIFICANT CHANGE UP (ref 0–6.7)
CK MB CFR SERPL CALC: 4.4 NG/ML — SIGNIFICANT CHANGE UP (ref 0–6.7)
CK SERPL-CCNC: 619 U/L — HIGH (ref 30–200)
CK SERPL-CCNC: 619 U/L — HIGH (ref 30–200)
CO2 BLDV-SCNC: 24.5 MMOL/L — SIGNIFICANT CHANGE UP (ref 22–26)
CO2 BLDV-SCNC: 24.5 MMOL/L — SIGNIFICANT CHANGE UP (ref 22–26)
CO2 SERPL-SCNC: 19 MMOL/L — LOW (ref 22–31)
CO2 SERPL-SCNC: 19 MMOL/L — LOW (ref 22–31)
CO2 SERPL-SCNC: 23 MMOL/L — SIGNIFICANT CHANGE UP (ref 22–31)
CO2 SERPL-SCNC: 23 MMOL/L — SIGNIFICANT CHANGE UP (ref 22–31)
COHGB MFR BLDV: 0.1 % — SIGNIFICANT CHANGE UP
COHGB MFR BLDV: 0.1 % — SIGNIFICANT CHANGE UP
CREAT SERPL-MCNC: 1.37 MG/DL — HIGH (ref 0.5–1.3)
CREAT SERPL-MCNC: 1.37 MG/DL — HIGH (ref 0.5–1.3)
CREAT SERPL-MCNC: 1.5 MG/DL — HIGH (ref 0.5–1.3)
CREAT SERPL-MCNC: 1.5 MG/DL — HIGH (ref 0.5–1.3)
EGFR: 47 ML/MIN/1.73M2 — LOW
EGFR: 47 ML/MIN/1.73M2 — LOW
EGFR: 53 ML/MIN/1.73M2 — LOW
EGFR: 53 ML/MIN/1.73M2 — LOW
EOSINOPHIL # BLD AUTO: 0.17 K/UL — SIGNIFICANT CHANGE UP (ref 0–0.5)
EOSINOPHIL # BLD AUTO: 0.17 K/UL — SIGNIFICANT CHANGE UP (ref 0–0.5)
EOSINOPHIL NFR BLD AUTO: 1.8 % — SIGNIFICANT CHANGE UP (ref 0–6)
EOSINOPHIL NFR BLD AUTO: 1.8 % — SIGNIFICANT CHANGE UP (ref 0–6)
ESTIMATED AVERAGE GLUCOSE: 358 MG/DL — HIGH (ref 68–114)
ESTIMATED AVERAGE GLUCOSE: 358 MG/DL — HIGH (ref 68–114)
GAS PNL BLDV: 134 MMOL/L — LOW (ref 136–145)
GAS PNL BLDV: 134 MMOL/L — LOW (ref 136–145)
GLUCOSE BLDC GLUCOMTR-MCNC: 149 MG/DL — HIGH (ref 70–99)
GLUCOSE BLDC GLUCOMTR-MCNC: 149 MG/DL — HIGH (ref 70–99)
GLUCOSE BLDC GLUCOMTR-MCNC: 224 MG/DL — HIGH (ref 70–99)
GLUCOSE BLDC GLUCOMTR-MCNC: 224 MG/DL — HIGH (ref 70–99)
GLUCOSE BLDV-MCNC: 278 MG/DL — HIGH (ref 70–99)
GLUCOSE BLDV-MCNC: 278 MG/DL — HIGH (ref 70–99)
GLUCOSE SERPL-MCNC: 260 MG/DL — HIGH (ref 70–99)
GLUCOSE SERPL-MCNC: 260 MG/DL — HIGH (ref 70–99)
GLUCOSE SERPL-MCNC: 364 MG/DL — HIGH (ref 70–99)
GLUCOSE SERPL-MCNC: 364 MG/DL — HIGH (ref 70–99)
HCO3 BLDV-SCNC: 23 MMOL/L — SIGNIFICANT CHANGE UP (ref 22–29)
HCO3 BLDV-SCNC: 23 MMOL/L — SIGNIFICANT CHANGE UP (ref 22–29)
HCT VFR BLD CALC: 42.6 % — SIGNIFICANT CHANGE UP (ref 39–50)
HCT VFR BLD CALC: 42.6 % — SIGNIFICANT CHANGE UP (ref 39–50)
HCT VFR BLD CALC: 47.6 % — SIGNIFICANT CHANGE UP (ref 39–50)
HCT VFR BLD CALC: 47.6 % — SIGNIFICANT CHANGE UP (ref 39–50)
HCT VFR BLDA CALC: 48 % — SIGNIFICANT CHANGE UP
HCT VFR BLDA CALC: 48 % — SIGNIFICANT CHANGE UP
HDLC SERPL-MCNC: 42 MG/DL — SIGNIFICANT CHANGE UP
HDLC SERPL-MCNC: 42 MG/DL — SIGNIFICANT CHANGE UP
HGB BLD CALC-MCNC: 15.9 G/DL — SIGNIFICANT CHANGE UP (ref 12.6–17.4)
HGB BLD CALC-MCNC: 15.9 G/DL — SIGNIFICANT CHANGE UP (ref 12.6–17.4)
HGB BLD-MCNC: 14.4 G/DL — SIGNIFICANT CHANGE UP (ref 13–17)
HGB BLD-MCNC: 14.4 G/DL — SIGNIFICANT CHANGE UP (ref 13–17)
HGB BLD-MCNC: 16.1 G/DL — SIGNIFICANT CHANGE UP (ref 13–17)
HGB BLD-MCNC: 16.1 G/DL — SIGNIFICANT CHANGE UP (ref 13–17)
IMM GRANULOCYTES NFR BLD AUTO: 0.3 % — SIGNIFICANT CHANGE UP (ref 0–0.9)
IMM GRANULOCYTES NFR BLD AUTO: 0.3 % — SIGNIFICANT CHANGE UP (ref 0–0.9)
INR BLD: 0.87 — SIGNIFICANT CHANGE UP (ref 0.85–1.18)
INR BLD: 0.87 — SIGNIFICANT CHANGE UP (ref 0.85–1.18)
ISTAT INR: 1.3 — HIGH (ref 0.88–1.16)
ISTAT INR: 1.3 — HIGH (ref 0.88–1.16)
ISTAT PT: 15 SEC — HIGH (ref 10–12.9)
ISTAT PT: 15 SEC — HIGH (ref 10–12.9)
LIPID PNL WITH DIRECT LDL SERPL: 84 MG/DL — SIGNIFICANT CHANGE UP
LIPID PNL WITH DIRECT LDL SERPL: 84 MG/DL — SIGNIFICANT CHANGE UP
LYMPHOCYTES # BLD AUTO: 3.71 K/UL — HIGH (ref 1–3.3)
LYMPHOCYTES # BLD AUTO: 3.71 K/UL — HIGH (ref 1–3.3)
LYMPHOCYTES # BLD AUTO: 38.7 % — SIGNIFICANT CHANGE UP (ref 13–44)
LYMPHOCYTES # BLD AUTO: 38.7 % — SIGNIFICANT CHANGE UP (ref 13–44)
MAGNESIUM SERPL-MCNC: 1.9 MG/DL — SIGNIFICANT CHANGE UP (ref 1.6–2.6)
MAGNESIUM SERPL-MCNC: 1.9 MG/DL — SIGNIFICANT CHANGE UP (ref 1.6–2.6)
MAGNESIUM SERPL-MCNC: 2.1 MG/DL — SIGNIFICANT CHANGE UP (ref 1.6–2.6)
MAGNESIUM SERPL-MCNC: 2.1 MG/DL — SIGNIFICANT CHANGE UP (ref 1.6–2.6)
MCHC RBC-ENTMCNC: 28.8 PG — SIGNIFICANT CHANGE UP (ref 27–34)
MCHC RBC-ENTMCNC: 28.8 PG — SIGNIFICANT CHANGE UP (ref 27–34)
MCHC RBC-ENTMCNC: 29 PG — SIGNIFICANT CHANGE UP (ref 27–34)
MCHC RBC-ENTMCNC: 29 PG — SIGNIFICANT CHANGE UP (ref 27–34)
MCHC RBC-ENTMCNC: 33.8 GM/DL — SIGNIFICANT CHANGE UP (ref 32–36)
MCV RBC AUTO: 85 FL — SIGNIFICANT CHANGE UP (ref 80–100)
MCV RBC AUTO: 85 FL — SIGNIFICANT CHANGE UP (ref 80–100)
MCV RBC AUTO: 85.7 FL — SIGNIFICANT CHANGE UP (ref 80–100)
MCV RBC AUTO: 85.7 FL — SIGNIFICANT CHANGE UP (ref 80–100)
METHGB MFR BLDV: 0 % — SIGNIFICANT CHANGE UP
METHGB MFR BLDV: 0 % — SIGNIFICANT CHANGE UP
MONOCYTES # BLD AUTO: 0.6 K/UL — SIGNIFICANT CHANGE UP (ref 0–0.9)
MONOCYTES # BLD AUTO: 0.6 K/UL — SIGNIFICANT CHANGE UP (ref 0–0.9)
MONOCYTES NFR BLD AUTO: 6.3 % — SIGNIFICANT CHANGE UP (ref 2–14)
MONOCYTES NFR BLD AUTO: 6.3 % — SIGNIFICANT CHANGE UP (ref 2–14)
NEUTROPHILS # BLD AUTO: 5.01 K/UL — SIGNIFICANT CHANGE UP (ref 1.8–7.4)
NEUTROPHILS # BLD AUTO: 5.01 K/UL — SIGNIFICANT CHANGE UP (ref 1.8–7.4)
NEUTROPHILS NFR BLD AUTO: 52.3 % — SIGNIFICANT CHANGE UP (ref 43–77)
NEUTROPHILS NFR BLD AUTO: 52.3 % — SIGNIFICANT CHANGE UP (ref 43–77)
NON HDL CHOLESTEROL: 124 MG/DL — SIGNIFICANT CHANGE UP
NON HDL CHOLESTEROL: 124 MG/DL — SIGNIFICANT CHANGE UP
NRBC # BLD: 0 /100 WBCS — SIGNIFICANT CHANGE UP (ref 0–0)
PCO2 BLDV: 41 MMHG — LOW (ref 42–55)
PCO2 BLDV: 41 MMHG — LOW (ref 42–55)
PH BLDV: 7.36 — SIGNIFICANT CHANGE UP (ref 7.32–7.43)
PH BLDV: 7.36 — SIGNIFICANT CHANGE UP (ref 7.32–7.43)
PLATELET # BLD AUTO: 340 K/UL — SIGNIFICANT CHANGE UP (ref 150–400)
PLATELET # BLD AUTO: 340 K/UL — SIGNIFICANT CHANGE UP (ref 150–400)
PLATELET # BLD AUTO: 373 K/UL — SIGNIFICANT CHANGE UP (ref 150–400)
PLATELET # BLD AUTO: 373 K/UL — SIGNIFICANT CHANGE UP (ref 150–400)
PO2 BLDV: 47 MMHG — HIGH (ref 25–45)
PO2 BLDV: 47 MMHG — HIGH (ref 25–45)
POTASSIUM BLDV-SCNC: 4.8 MMOL/L — SIGNIFICANT CHANGE UP (ref 3.5–5.1)
POTASSIUM BLDV-SCNC: 4.8 MMOL/L — SIGNIFICANT CHANGE UP (ref 3.5–5.1)
POTASSIUM SERPL-MCNC: 4.4 MMOL/L — SIGNIFICANT CHANGE UP (ref 3.5–5.3)
POTASSIUM SERPL-MCNC: 4.4 MMOL/L — SIGNIFICANT CHANGE UP (ref 3.5–5.3)
POTASSIUM SERPL-MCNC: 4.5 MMOL/L — SIGNIFICANT CHANGE UP (ref 3.5–5.3)
POTASSIUM SERPL-MCNC: 4.5 MMOL/L — SIGNIFICANT CHANGE UP (ref 3.5–5.3)
POTASSIUM SERPL-SCNC: 4.4 MMOL/L — SIGNIFICANT CHANGE UP (ref 3.5–5.3)
POTASSIUM SERPL-SCNC: 4.4 MMOL/L — SIGNIFICANT CHANGE UP (ref 3.5–5.3)
POTASSIUM SERPL-SCNC: 4.5 MMOL/L — SIGNIFICANT CHANGE UP (ref 3.5–5.3)
POTASSIUM SERPL-SCNC: 4.5 MMOL/L — SIGNIFICANT CHANGE UP (ref 3.5–5.3)
PROT SERPL-MCNC: 8.8 G/DL — HIGH (ref 6–8.3)
PROT SERPL-MCNC: 8.8 G/DL — HIGH (ref 6–8.3)
PROTHROM AB SERPL-ACNC: 10 SEC — SIGNIFICANT CHANGE UP (ref 9.5–13)
PROTHROM AB SERPL-ACNC: 10 SEC — SIGNIFICANT CHANGE UP (ref 9.5–13)
RBC # BLD: 4.97 M/UL — SIGNIFICANT CHANGE UP (ref 4.2–5.8)
RBC # BLD: 4.97 M/UL — SIGNIFICANT CHANGE UP (ref 4.2–5.8)
RBC # BLD: 5.6 M/UL — SIGNIFICANT CHANGE UP (ref 4.2–5.8)
RBC # BLD: 5.6 M/UL — SIGNIFICANT CHANGE UP (ref 4.2–5.8)
RBC # FLD: 12.8 % — SIGNIFICANT CHANGE UP (ref 10.3–14.5)
RBC # FLD: 12.8 % — SIGNIFICANT CHANGE UP (ref 10.3–14.5)
RBC # FLD: 13 % — SIGNIFICANT CHANGE UP (ref 10.3–14.5)
RBC # FLD: 13 % — SIGNIFICANT CHANGE UP (ref 10.3–14.5)
SAO2 % BLDV: 76 % — SIGNIFICANT CHANGE UP (ref 67–88)
SAO2 % BLDV: 76 % — SIGNIFICANT CHANGE UP (ref 67–88)
SODIUM SERPL-SCNC: 135 MMOL/L — SIGNIFICANT CHANGE UP (ref 135–145)
SODIUM SERPL-SCNC: 135 MMOL/L — SIGNIFICANT CHANGE UP (ref 135–145)
SODIUM SERPL-SCNC: 136 MMOL/L — SIGNIFICANT CHANGE UP (ref 135–145)
SODIUM SERPL-SCNC: 136 MMOL/L — SIGNIFICANT CHANGE UP (ref 135–145)
TRIGL SERPL-MCNC: 201 MG/DL — HIGH
TRIGL SERPL-MCNC: 201 MG/DL — HIGH
WBC # BLD: 9.08 K/UL — SIGNIFICANT CHANGE UP (ref 3.8–10.5)
WBC # BLD: 9.08 K/UL — SIGNIFICANT CHANGE UP (ref 3.8–10.5)
WBC # BLD: 9.58 K/UL — SIGNIFICANT CHANGE UP (ref 3.8–10.5)
WBC # BLD: 9.58 K/UL — SIGNIFICANT CHANGE UP (ref 3.8–10.5)
WBC # FLD AUTO: 9.08 K/UL — SIGNIFICANT CHANGE UP (ref 3.8–10.5)
WBC # FLD AUTO: 9.08 K/UL — SIGNIFICANT CHANGE UP (ref 3.8–10.5)
WBC # FLD AUTO: 9.58 K/UL — SIGNIFICANT CHANGE UP (ref 3.8–10.5)
WBC # FLD AUTO: 9.58 K/UL — SIGNIFICANT CHANGE UP (ref 3.8–10.5)

## 2023-11-10 PROCEDURE — 83735 ASSAY OF MAGNESIUM: CPT

## 2023-11-10 PROCEDURE — 93459 L HRT ART/GRFT ANGIO: CPT | Mod: 26,59

## 2023-11-10 PROCEDURE — C1769: CPT

## 2023-11-10 PROCEDURE — 93010 ELECTROCARDIOGRAM REPORT: CPT

## 2023-11-10 PROCEDURE — C1887: CPT

## 2023-11-10 PROCEDURE — 83036 HEMOGLOBIN GLYCOSYLATED A1C: CPT

## 2023-11-10 PROCEDURE — 85610 PROTHROMBIN TIME: CPT

## 2023-11-10 PROCEDURE — 93005 ELECTROCARDIOGRAM TRACING: CPT

## 2023-11-10 PROCEDURE — 85027 COMPLETE CBC AUTOMATED: CPT

## 2023-11-10 PROCEDURE — C9600: CPT | Mod: LD

## 2023-11-10 PROCEDURE — 80061 LIPID PANEL: CPT

## 2023-11-10 PROCEDURE — 85347 COAGULATION TIME ACTIVATED: CPT

## 2023-11-10 PROCEDURE — 99152 MOD SED SAME PHYS/QHP 5/>YRS: CPT

## 2023-11-10 PROCEDURE — 0715T: CPT

## 2023-11-10 PROCEDURE — 99153 MOD SED SAME PHYS/QHP EA: CPT

## 2023-11-10 PROCEDURE — C1725: CPT

## 2023-11-10 PROCEDURE — C1760: CPT

## 2023-11-10 PROCEDURE — 36415 COLL VENOUS BLD VENIPUNCTURE: CPT

## 2023-11-10 PROCEDURE — 92928 PRQ TCAT PLMT NTRAC ST 1 LES: CPT | Mod: LD

## 2023-11-10 PROCEDURE — C1894: CPT

## 2023-11-10 PROCEDURE — 82803 BLOOD GASES ANY COMBINATION: CPT

## 2023-11-10 PROCEDURE — 93459 L HRT ART/GRFT ANGIO: CPT | Mod: 59

## 2023-11-10 PROCEDURE — 82962 GLUCOSE BLOOD TEST: CPT

## 2023-11-10 PROCEDURE — 82550 ASSAY OF CK (CPK): CPT

## 2023-11-10 PROCEDURE — 85025 COMPLETE CBC W/AUTO DIFF WBC: CPT

## 2023-11-10 PROCEDURE — 80048 BASIC METABOLIC PNL TOTAL CA: CPT

## 2023-11-10 PROCEDURE — 85730 THROMBOPLASTIN TIME PARTIAL: CPT

## 2023-11-10 PROCEDURE — C1874: CPT

## 2023-11-10 PROCEDURE — 80053 COMPREHEN METABOLIC PANEL: CPT

## 2023-11-10 PROCEDURE — 82553 CREATINE MB FRACTION: CPT

## 2023-11-10 PROCEDURE — C1761: CPT

## 2023-11-10 RX ORDER — ASPIRIN/CALCIUM CARB/MAGNESIUM 324 MG
1 TABLET ORAL
Qty: 30 | Refills: 11
Start: 2023-11-10 | End: 2024-11-03

## 2023-11-10 RX ORDER — LISINOPRIL 2.5 MG/1
1 TABLET ORAL
Qty: 0 | Refills: 0 | DISCHARGE

## 2023-11-10 RX ORDER — SIMVASTATIN 20 MG/1
1 TABLET, FILM COATED ORAL
Qty: 0 | Refills: 0 | DISCHARGE

## 2023-11-10 RX ORDER — FINASTERIDE 5 MG/1
1 TABLET, FILM COATED ORAL
Refills: 0 | DISCHARGE

## 2023-11-10 RX ORDER — FENOFIBRATE,MICRONIZED 130 MG
1 CAPSULE ORAL
Refills: 0 | DISCHARGE

## 2023-11-10 RX ORDER — TAMSULOSIN HYDROCHLORIDE 0.4 MG/1
1 CAPSULE ORAL
Qty: 0 | Refills: 0 | DISCHARGE

## 2023-11-10 RX ORDER — MAGNESIUM OXIDE 400 MG ORAL TABLET 241.3 MG
1 TABLET ORAL
Refills: 0 | DISCHARGE

## 2023-11-10 RX ORDER — CLOPIDOGREL BISULFATE 75 MG/1
1 TABLET, FILM COATED ORAL
Qty: 30 | Refills: 11
Start: 2023-11-10 | End: 2024-11-03

## 2023-11-10 RX ORDER — EMPAGLIFLOZIN 10 MG/1
1 TABLET, FILM COATED ORAL
Qty: 0 | Refills: 0 | DISCHARGE

## 2023-11-10 RX ORDER — CLOPIDOGREL BISULFATE 75 MG/1
75 TABLET, FILM COATED ORAL ONCE
Refills: 0 | Status: COMPLETED | OUTPATIENT
Start: 2023-11-10 | End: 2023-11-10

## 2023-11-10 RX ORDER — ASPIRIN/CALCIUM CARB/MAGNESIUM 324 MG
81 TABLET ORAL ONCE
Refills: 0 | Status: COMPLETED | OUTPATIENT
Start: 2023-11-10 | End: 2023-11-10

## 2023-11-10 RX ORDER — SITAGLIPTIN 50 MG/1
1 TABLET, FILM COATED ORAL
Qty: 0 | Refills: 0 | DISCHARGE

## 2023-11-10 RX ORDER — ICOSAPENT ETHYL 500 MG/1
2 CAPSULE, LIQUID FILLED ORAL
Qty: 0 | Refills: 0 | DISCHARGE

## 2023-11-10 RX ORDER — FENOFIBRATE,MICRONIZED 130 MG
1 CAPSULE ORAL
Qty: 0 | Refills: 0 | DISCHARGE

## 2023-11-10 RX ORDER — SODIUM CHLORIDE 9 MG/ML
250 INJECTION INTRAMUSCULAR; INTRAVENOUS; SUBCUTANEOUS ONCE
Refills: 0 | Status: COMPLETED | OUTPATIENT
Start: 2023-11-10 | End: 2023-11-10

## 2023-11-10 RX ORDER — METFORMIN HYDROCHLORIDE 850 MG/1
1 TABLET ORAL
Qty: 0 | Refills: 0 | DISCHARGE

## 2023-11-10 RX ORDER — METOPROLOL TARTRATE 50 MG
1 TABLET ORAL
Qty: 0 | Refills: 0 | DISCHARGE

## 2023-11-10 RX ORDER — SODIUM CHLORIDE 9 MG/ML
500 INJECTION INTRAMUSCULAR; INTRAVENOUS; SUBCUTANEOUS
Refills: 0 | Status: DISCONTINUED | OUTPATIENT
Start: 2023-11-10 | End: 2023-11-24

## 2023-11-10 RX ADMIN — CLOPIDOGREL BISULFATE 75 MILLIGRAM(S): 75 TABLET, FILM COATED ORAL at 08:55

## 2023-11-10 RX ADMIN — SODIUM CHLORIDE 250 MILLILITER(S): 9 INJECTION INTRAMUSCULAR; INTRAVENOUS; SUBCUTANEOUS at 12:32

## 2023-11-10 RX ADMIN — SODIUM CHLORIDE 1000 MILLILITER(S): 9 INJECTION INTRAMUSCULAR; INTRAVENOUS; SUBCUTANEOUS at 09:34

## 2023-11-10 RX ADMIN — Medication 81 MILLIGRAM(S): at 08:56

## 2023-11-10 RX ADMIN — SODIUM CHLORIDE 75 MILLILITER(S): 9 INJECTION INTRAMUSCULAR; INTRAVENOUS; SUBCUTANEOUS at 09:35

## 2023-11-10 NOTE — PROGRESS NOTE ADULT - SUBJECTIVE AND OBJECTIVE BOX
Interventional Cardiology PA Post PCI SDA Discharge Note      Patient without complaints. Ambulated and voided without difficulties    Afebrile, VSS    Ext: Right Groin: No hematoma, no bruit, dressing; C/D/I      Pulses:    DP/PT to baseline     A/P: 75yo Turkmen-speaking Male, former smoker, w/ PMHx of HTN, HLD, DM T2, BPH, CAD (s/p CABG (LIMA-LAD and SVG -OM1) and PCI's,  CKD Stage III (Cr 1.37 on admission, Cr 1.25-1.36 October 2022) who presents for cardiac cath due to patient's risk factors, CCS Angina Class III Symptoms and abnormal CCTA.    Pt is now s/p cardiac cath 11/10/23 with Dr. Crane/IC fellow Yoselin: Lithotripsy + DESx1 mLAD ISR 90%. Patent prior graft. No EDP. EF 62mmHg.     1.	Follow-up with PMD/Cardiologist Dr. Ashford in 72 hours.  2.                 Post procedure labs/EKG reviewed and stable.    3.                 Pt given instructions on importance of taking antiplatelet medication.    4. 	Stable for discharge as per attending Dr. Crane after bed rest, pt voids, groin/wrist stable and 30 minutes of ambulation.  5.                 Prescriptions for Aspirin/Plavix/Brilinta/Effient e-prescribed and submitted to patient's pharmacy.    6.                 Patient will continue rosuvastatin 20mg.   7.	Discharge forms signed and copies in chart

## 2023-11-22 DIAGNOSIS — I25.110 ATHEROSCLEROTIC HEART DISEASE OF NATIVE CORONARY ARTERY WITH UNSTABLE ANGINA PECTORIS: ICD-10-CM

## 2023-11-22 DIAGNOSIS — Z95.1 PRESENCE OF AORTOCORONARY BYPASS GRAFT: ICD-10-CM

## 2023-11-22 DIAGNOSIS — R94.39 ABNORMAL RESULT OF OTHER CARDIOVASCULAR FUNCTION STUDY: ICD-10-CM

## 2023-11-22 DIAGNOSIS — I25.82 CHRONIC TOTAL OCCLUSION OF CORONARY ARTERY: ICD-10-CM

## 2023-12-07 LAB
ISTAT ACTK (ACTIVATED CLOTTING TIME KAOLIN): 455 SEC — HIGH (ref 74–137)
ISTAT ACTK (ACTIVATED CLOTTING TIME KAOLIN): 455 SEC — HIGH (ref 74–137)

## 2025-01-24 NOTE — HISTORY OF PRESENT ILLNESS
[FreeTextEntry1] :  \par \par 1. HTN: on medications, no SE noted. \par 2. Hyperlipidemia: on statin. The lipid profile is followed by PMD. \par 3. CAD s/p CABG: patient had CABG about 20 years ago. Last PCI in 2018. Patient had worsening CP and underwent cardiac catheterization that showed severe LAD stenosis. He underwent PCIx2 in 9-2020. He is now on ASA and plavix. \par  \par His echo in 9/2020 showed normal LVEF.\par \par The patient received both doses of his COVID vaccine.\par He denies CP or SOB.\par \par He is scheduled for dental surgery. no